# Patient Record
Sex: FEMALE | Race: WHITE | Employment: FULL TIME | URBAN - METROPOLITAN AREA
[De-identification: names, ages, dates, MRNs, and addresses within clinical notes are randomized per-mention and may not be internally consistent; named-entity substitution may affect disease eponyms.]

---

## 2017-03-17 ENCOUNTER — OFFICE VISIT (OUTPATIENT)
Dept: FAMILY MEDICINE CLINIC | Age: 47
End: 2017-03-17

## 2017-03-17 VITALS
RESPIRATION RATE: 22 BRPM | TEMPERATURE: 97.1 F | SYSTOLIC BLOOD PRESSURE: 125 MMHG | WEIGHT: 175 LBS | HEIGHT: 66 IN | BODY MASS INDEX: 28.12 KG/M2 | HEART RATE: 89 BPM | OXYGEN SATURATION: 95 % | DIASTOLIC BLOOD PRESSURE: 89 MMHG

## 2017-03-17 DIAGNOSIS — J30.2 SEASONAL ALLERGIC RHINITIS, UNSPECIFIED ALLERGIC RHINITIS TRIGGER: ICD-10-CM

## 2017-03-17 DIAGNOSIS — J45.20 MILD INTERMITTENT ASTHMA WITHOUT COMPLICATION: Primary | ICD-10-CM

## 2017-03-17 DIAGNOSIS — K21.00 GASTROESOPHAGEAL REFLUX DISEASE WITH ESOPHAGITIS: ICD-10-CM

## 2017-03-17 RX ORDER — CHLORDIAZEPOXIDE HYDROCHLORIDE AND CLIDINIUM BROMIDE 5; 2.5 MG/1; MG/1
CAPSULE ORAL
Refills: 3 | COMMUNITY
Start: 2017-02-26 | End: 2019-01-09 | Stop reason: ALTCHOICE

## 2017-03-17 RX ORDER — DEXLANSOPRAZOLE 60 MG/1
60 CAPSULE, DELAYED RELEASE ORAL
Qty: 30 CAP | Refills: 3 | Status: SHIPPED | OUTPATIENT
Start: 2017-03-17 | End: 2019-01-09 | Stop reason: ALTCHOICE

## 2017-03-17 RX ORDER — ALBUTEROL SULFATE 90 UG/1
2 AEROSOL, METERED RESPIRATORY (INHALATION)
Qty: 1 INHALER | Refills: 3 | Status: SHIPPED | OUTPATIENT
Start: 2017-03-17 | End: 2019-01-09 | Stop reason: ALTCHOICE

## 2017-03-17 NOTE — PROGRESS NOTES
Subjective:   Mally Kenney is a 40 y.o. female seen urgently with exacerbation of asthma for 3 days. Wheezing is described as mild-moderate. Associated symptoms:dry cough. Current asthma medications: Proventil MDI (or generic equivalent). Patient does not smoke cigarettes. GERD has flared up due to a few dietary excursions. She reports a very complicated history including chronic gastritis and GERD, ulcerative colitis, allergies, asthma. She is being followed by multiple providers as well as PCP. She is a regular runner, and is outside running often during the spring allergy season. She was able to complete her usual activity and work routines despite her symptoms. She has not tried allegra or Zyrtec for allergy management. She occasionally takes Loratadine. She is on Remicaid IV q 8 weeks to manage Ulcerative colitis symptoms and that is working very well. Review of Systems  Pertinent items are noted in HPI. Objective:     Visit Vitals    /89 (BP 1 Location: Right arm, BP Patient Position: Sitting)    Pulse 89    Temp 97.1 °F (36.2 °C) (Oral)    Resp 22    Ht 5' 6\" (1.676 m)    Wt 175 lb (79.4 kg)    LMP 03/01/2017 (Exact Date)    SpO2 95%    BMI 28.25 kg/m2     Oxygens Saturation 95% on  room air  General:  alert, cooperative, no distress   HEENT:  ENT exam normal, no neck nodes or sinus tenderness   Lungs: clear to auscultation bilaterally   Heart:  regular rate and rhythm, S1, S2 normal, no murmur, click, rub or gallop   Abdomen: soft, non-tender. Bowel sounds normal. No masses,  no organomegaly   Extremities: extremities normal, atraumatic, no cyanosis or edema     Assessment/Plan:   Asthma, acute exacerbation  GERD with chronic Gastritis  Allergic rhinitis    oximetry on room air was 95%  RX per orders - Use bronchodilator MDI 2 puff q6h prn,Asthma management may require a pulmonologist, and I have declined her request to prescribe a long term steroid inhaler.  Additional suggestions to patient: use antihistamine-decongestant of choice prn, push fluids, rest, use a vaporizer prn and manage GERD. Dexilant 60 mg was prescribed, pt aware that insurance may not cover, if unavailable, buy OTC Nexium and take for one month in place of Omeprazole. Return to tight control of dietary restrictions to avoid flare-ups of GERD. ICD-10-CM ICD-9-CM    1. Mild intermittent asthma without complication D70.30 965.97 albuterol (PROVENTIL HFA, VENTOLIN HFA, PROAIR HFA) 90 mcg/actuation inhaler   2. Gastroesophageal reflux disease with esophagitis K21.0 530.11 Dexlansoprazole (DEXILANT) 60 mg CpDB   .

## 2017-03-17 NOTE — PATIENT INSTRUCTIONS
Asthma Action Plan: After Your Visit  Your Care Instructions  An asthma action plan is based on peak flow and asthma symptoms. Sorting symptoms and peak flow into red, yellow, and green \"zones\" can help you know how bad your asthma is and what actions you should take. Work with your doctor to make your plan. An action plan may include:  · The peak flow readings and symptoms for each zone. · What medicines to take in each zone. · When to call a doctor. · A list of emergency contact numbers. · A list of your asthma triggers. Follow-up care is a key part of your treatment and safety. Be sure to make and go to all appointments, and call your doctor if you are having problems. It's also a good idea to know your test results and keep a list of the medicines you take. How can you care for yourself at home? · Take your daily medicines to help minimize long-term damage and avoid asthma attacks. · Check your peak flow every morning and evening. This is the best way to know how well your lungs are working. · Check your action plan to see what zone you are in.  ¨ If you are in the green zone, keep taking your daily asthma medicines as prescribed. ¨ If you are in the yellow zone, you may be having or will soon have an asthma attack. You may not have any symptoms, but your lungs are not working as well as they should. Take the medicines listed in your action plan. If you stay in the yellow zone, your doctor may need to increase the dose or add a medicine. ¨ If you are in the red zone, follow your action plan. If your symptoms or peak flow don't improve soon, you may need to go to the emergency room or be admitted to the hospital.  · Use an asthma diary. Write down your peak flow readings in the asthma diary. If you have an attack, write down what caused it (if you know), the symptoms, and what medicine you took.   · Make sure you know how and when to call your doctor or go to the hospital.  · Take both the asthma action plan and the asthma diary--along with your peak flow meter and medicines--when you see your doctor. Tell your doctor if you are having trouble following your action plan. When should you call for help? Call 911 anytime you think you may need emergency care. For example, call if:  · You have severe trouble breathing. Call your doctor now or seek immediate medical care if:  · Your symptoms do not get better after you have followed your asthma action plan. · You cough up yellow, dark brown, or bloody mucus (sputum). Watch closely for changes in your health, and be sure to contact your doctor if:  · Your coughing and wheezing get worse. · You need to use quick-relief medicine on more than 2 days a week (unless it is just for exercise). · You need help figuring out what is triggering your asthma attacks. Where can you learn more? Go to [employer] Voice.be  Enter B511 in the search box to learn more about \"Asthma Action Plan: After Your Visit. \"   © 0121-8884 Healthwise, Incorporated. Care instructions adapted under license by [employer] (which disclaims liability or warranty for this information). This care instruction is for use with your licensed healthcare professional. If you have questions about a medical condition or this instruction, always ask your healthcare professional. [place] [address] any warranty or liability for your use of this information. Content Version: 89.4.612931;  Last Revised: March 9, 2012

## 2017-04-03 ENCOUNTER — HOSPITAL ENCOUNTER (OUTPATIENT)
Dept: GENERAL RADIOLOGY | Age: 47
Discharge: HOME | End: 2017-04-03
Payer: COMMERCIAL

## 2017-04-03 DIAGNOSIS — J45.909 EXTRINSIC ASTHMA, UNSPECIFIED: ICD-10-CM

## 2017-04-03 PROCEDURE — 71020 XR CHEST PA LAT: CPT

## 2017-06-06 ENCOUNTER — HOSPITAL ENCOUNTER (OUTPATIENT)
Dept: GENERAL RADIOLOGY | Age: 47
Discharge: HOME | End: 2017-06-06
Payer: COMMERCIAL

## 2017-06-06 DIAGNOSIS — R05.9 COUGH: ICD-10-CM

## 2017-06-06 PROCEDURE — 71020 XR CHEST PA LAT: CPT

## 2019-01-09 ENCOUNTER — OFFICE VISIT (OUTPATIENT)
Dept: PRIMARY CARE CLINIC | Age: 49
End: 2019-01-09

## 2019-01-09 VITALS
RESPIRATION RATE: 16 BRPM | HEIGHT: 66 IN | BODY MASS INDEX: 29.86 KG/M2 | WEIGHT: 185.8 LBS | DIASTOLIC BLOOD PRESSURE: 82 MMHG | TEMPERATURE: 97.8 F | OXYGEN SATURATION: 99 % | HEART RATE: 92 BPM | SYSTOLIC BLOOD PRESSURE: 118 MMHG

## 2019-01-09 DIAGNOSIS — J01.10 ACUTE NON-RECURRENT FRONTAL SINUSITIS: Primary | ICD-10-CM

## 2019-01-09 RX ORDER — PANTOPRAZOLE SODIUM 40 MG/1
40 TABLET, DELAYED RELEASE ORAL DAILY
COMMUNITY

## 2019-01-09 RX ORDER — DOXYCYCLINE 100 MG/1
100 TABLET ORAL 2 TIMES DAILY
Qty: 20 TAB | Refills: 0 | Status: SHIPPED | OUTPATIENT
Start: 2019-01-09 | End: 2019-01-19

## 2019-01-09 NOTE — PROGRESS NOTES
Chief Complaint   Patient presents with    Pressure Behind the Eyes     Pt c/o sinus pressure and congestion x 1 1/2 month. Pt has taken otc sudafed and Tylenol for relief.

## 2019-01-10 NOTE — PROGRESS NOTES
Subjective:   Precious Ross is a 53 y.o. female who complains of coryza, congestion, sore throat, swollen glands, dry cough, cough described as productive, productive of dark sputum, headache, bilateral sinus pain and chills for 8 days, gradually worsening since that time. She denies a history of shortness of breath and wheezing. Patient has tenderness over the frontal sinus area on both sides. Reports she is able to get most of the mucus out by blowing her nose but it is now a very dark color. Patient is ill appearing. Will start course of antibiotics today. Evaluation to date: none. Treatment to date: OTC products. Patient does not smoke cigarettes. Relevant PMH: No pertinent additional PMH. Patient Active Problem List   Diagnosis Code    Colitis K52.9     Patient Active Problem List    Diagnosis Date Noted    Colitis 06/07/2013     Current Outpatient Medications   Medication Sig Dispense Refill    pantoprazole (PROTONIX) 40 mg tablet Take 40 mg by mouth daily.  doxycycline (ADOXA) 100 mg tablet Take 1 Tab by mouth two (2) times a day for 10 days. 20 Tab 0    INFLIXIMAB (REMICADE IV) 412 mg by IntraVENous route every two (2) months. Allergies   Allergen Reactions    Banana Hives     Vomiting      Nsaids (Non-Steroidal Anti-Inflammatory Drug) Other (comments)     Exacerbation of Ulcerative Colitis    Sulfa (Sulfonamide Antibiotics) Nausea and Vomiting    Sulfa (Sulfonamide Antibiotics) Hives     Past Medical History:   Diagnosis Date    Asthma     GERD (gastroesophageal reflux disease)     Ulcerative colitis     Ulcerative colitis ([facility] [address]      Past Surgical History:   Procedure Laterality Date    HX LIPOSUCTION      HX VASCULAR ACCESS      removed in 2008     History reviewed. No pertinent family history.   Social History     Tobacco Use    Smoking status: Never Smoker    Smokeless tobacco: Never Used   Substance Use Topics    Alcohol use: Yes        Review of Systems  A comprehensive review of systems was negative except for that written in the HPI. Objective:     Visit Vitals  /82   Pulse 92   Temp 97.8 °F (36.6 °C) (Oral)   Resp 16   Ht 5' 6\" (1.676 m)   Wt 185 lb 12.8 oz (84.3 kg)   SpO2 99%   BMI 29.99 kg/m²     General:  alert, cooperative, no distress   Eyes: conjunctivae/corneas clear. PERRL, EOM's intact. Fundi benign   Ears: normal TM's and external ear canals AU   Sinuses: tenderness over bilateral frontal   Mouth:  Lips, mucosa, and tongue normal. Teeth and gums normal   Neck: supple, symmetrical, trachea midline and no adenopathy. Heart: S1 and S2 normal, no murmurs noted. Lungs: clear to auscultation bilaterally   Abdomen: soft, non-tender. Bowel sounds normal. No masses,  no organomegaly        Assessment/Plan:   sinusitis  Suggested symptomatic OTC remedies. RTC prn. Discussed diagnosis and treatment of viral URIs. ICD-10-CM ICD-9-CM    1. Acute non-recurrent frontal sinusitis J01.10 461.1 doxycycline (ADOXA) 100 mg tablet   . Visit Vitals  /82   Pulse 92   Temp 97.8 °F (36.6 °C) (Oral)   Resp 16   Ht 5' 6\" (1.676 m)   Wt 185 lb 12.8 oz (84.3 kg)   SpO2 99%   BMI 29.99 kg/m²     Spoke with the patient regarding their blood pressure (BP) reading at today's visit. The patient verbalized understanding of need to maintain BP lower than 140/90. The patient will follow up with their primary care physician regarding management and/or medications that may be needed. Drepssion Screening has been completed. The patient isdenies having a history of depression. The patient is nottaking medication and is being followed by their PCP at this time. This patient does  have a primary care physician. Referral was not given a referral at todays visit. Immunizations: The patient is current on their influenza immunization at this time. The patient does not   want to receive the influenza immunization today.     Follow up instructions given at today's visit were verbalized by the patient/parent. The signs of infection are fever > 100.4, increase fatigue, change in mental status, or decrease in urinary output. The patient/parent verbalized understanding of taking medications prescribed during this visit as prescribed.

## 2019-01-10 NOTE — PATIENT INSTRUCTIONS
Sinusitis: Care Instructions  Your Care Instructions    Sinusitis is an infection of the lining of the sinus cavities in your head. Sinusitis often follows a cold. It causes pain and pressure in your head and face. In most cases, sinusitis gets better on its own in 1 to 2 weeks. But some mild symptoms may last for several weeks. Sometimes antibiotics are needed. Follow-up care is a key part of your treatment and safety. Be sure to make and go to all appointments, and call your doctor if you are having problems. It's also a good idea to know your test results and keep a list of the medicines you take. How can you care for yourself at home? · Take an over-the-counter pain medicine, such as acetaminophen (Tylenol), ibuprofen (Advil, Motrin), or naproxen (Aleve). Read and follow all instructions on the label. · If the doctor prescribed antibiotics, take them as directed. Do not stop taking them just because you feel better. You need to take the full course of antibiotics. · Be careful when taking over-the-counter cold or flu medicines and Tylenol at the same time. Many of these medicines have acetaminophen, which is Tylenol. Read the labels to make sure that you are not taking more than the recommended dose. Too much acetaminophen (Tylenol) can be harmful. · Breathe warm, moist air from a steamy shower, a hot bath, or a sink filled with hot water. Avoid cold, dry air. Using a humidifier in your home may help. Follow the directions for cleaning the machine. · Use saline (saltwater) nasal washes to help keep your nasal passages open and wash out mucus and bacteria. You can buy saline nose drops at a grocery store or drugstore. Or you can make your own at home by adding 1 teaspoon of salt and 1 teaspoon of baking soda to 2 cups of distilled water. If you make your own, fill a bulb syringe with the solution, insert the tip into your nostril, and squeeze gently. Jair Irons your nose.   · Put a hot, wet towel or a warm gel pack on your face 3 or 4 times a day for 5 to 10 minutes each time. · Try a decongestant nasal spray like oxymetazoline (Afrin). Do not use it for more than 3 days in a row. Using it for more than 3 days can make your congestion worse. When should you call for help? Call your doctor now or seek immediate medical care if:    · You have new or worse swelling or redness in your face or around your eyes.     · You have a new or higher fever.    Watch closely for changes in your health, and be sure to contact your doctor if:    · You have new or worse facial pain.     · The mucus from your nose becomes thicker (like pus) or has new blood in it.     · You are not getting better as expected. Where can you learn more? Go to http://gabby-anil.info/. Enter Y674 in the search box to learn more about \"Sinusitis: Care Instructions. \"  Current as of: March 28, 2018  Content Version: 11.8  © 5200-7667 Healthwise, Incorporated. Care instructions adapted under license by [email or web address] (which disclaims liability or warranty for this information). If you have questions about a medical condition or this instruction, always ask your healthcare professional. [place] [address] any warranty or liability for your use of this information.

## 2019-03-12 ENCOUNTER — HOSPITAL ENCOUNTER (OUTPATIENT)
Dept: MRI IMAGING | Age: 49
Discharge: HOME | End: 2019-03-12
Payer: COMMERCIAL

## 2019-03-12 DIAGNOSIS — M17.10 PATELLOFEMORAL ARTHROSIS: ICD-10-CM

## 2019-03-12 DIAGNOSIS — M25.561 CHRONIC PAIN OF RIGHT KNEE: ICD-10-CM

## 2019-03-12 DIAGNOSIS — G89.29 CHRONIC PAIN OF RIGHT KNEE: ICD-10-CM

## 2019-03-12 DIAGNOSIS — M25.561 PAIN IN PATELLA, RIGHT: ICD-10-CM

## 2019-03-12 DIAGNOSIS — S83.241A TEAR OF MEDIAL MENISCUS OF RIGHT KNEE, INITIAL ENCOUNTER: ICD-10-CM

## 2019-03-12 PROCEDURE — 73721 MRI JNT OF LWR EXTRE W/O DYE: CPT

## 2019-03-13 ENCOUNTER — OFFICE VISIT (OUTPATIENT)
Dept: PRIMARY CARE CLINIC | Age: 49
End: 2019-03-13

## 2019-03-13 VITALS
DIASTOLIC BLOOD PRESSURE: 80 MMHG | SYSTOLIC BLOOD PRESSURE: 123 MMHG | BODY MASS INDEX: 29.89 KG/M2 | HEIGHT: 66 IN | OXYGEN SATURATION: 96 % | RESPIRATION RATE: 17 BRPM | TEMPERATURE: 97.9 F | HEART RATE: 100 BPM | WEIGHT: 186 LBS

## 2019-03-13 DIAGNOSIS — R22.31 LOCALIZED SWELLING ON RIGHT HAND: ICD-10-CM

## 2019-03-13 DIAGNOSIS — L03.113 CELLULITIS OF RIGHT HAND: Primary | ICD-10-CM

## 2019-03-13 RX ORDER — CEPHALEXIN 500 MG/1
500 CAPSULE ORAL 3 TIMES DAILY
Qty: 30 CAP | Refills: 0 | Status: SHIPPED | OUTPATIENT
Start: 2019-03-13 | End: 2020-01-31 | Stop reason: ALTCHOICE

## 2019-03-13 NOTE — PROGRESS NOTES
Chief Complaint   Patient presents with    Hand Swelling     Pt c/i right hand swelling and redness x 2 weeks.

## 2019-03-13 NOTE — PATIENT INSTRUCTIONS
Hand Bruises: Care Instructions  Your Care Instructions  Bruises, or contusions, can happen as a result of an impact or fall. Most people think of a bruise as a black-and-blue spot. This happens when small blood vessels get torn and leak blood under the skin. The bruise may turn purplish black, reddish blue, or yellowish green as it heals. But bones and muscles can also get bruised. This may damage the hand but not cause a bruise that you can see. Most bruises aren't serious and will go away on their own in 2 to 4 weeks. But sometimes a more serious hand injury might not heal on its own. Tell your doctor if you have new symptoms or your injury is not getting better over time. You may have tests to see if you have bone or nerve damage. These tests may include X-rays, a CT scan, or an MRI. If you damaged bones or muscles, you may need more treatment. The doctor has checked you carefully, but problems can develop later. If you notice any problems or new symptoms, get medical treatment right away. Follow-up care is a key part of your treatment and safety. Be sure to make and go to all appointments, and call your doctor if you are having problems. It's also a good idea to know your test results and keep a list of the medicines you take. How can you care for yourself at home? · Put ice or a cold pack on the hand for 10 to 20 minutes at a time. Put a thin cloth between the ice and your skin. · Prop up your hand on a pillow when you ice it or anytime you sit or lie down during the next 3 days. Try to keep your hand above the level of your heart. This will help reduce swelling. · Be safe with medicines. Read and follow all instructions on the label. ? If the doctor gave you a prescription medicine for pain, take it as prescribed. ? If you are not taking a prescription pain medicine, ask your doctor if you can take an over-the-counter medicine.   · Be sure to follow your doctor's advice about moving and exercising your injured hand. When should you call for help? Call your doctor now or seek immediate medical care if:    · Your pain gets worse.     · You have new or worse swelling.     · You have tingling, weakness, or numbness in the area near the bruise.     · The area near the bruise is cold or pale.     · You have symptoms of infection, such as:  ? Increased pain, swelling, warmth, or redness. ? Red streaks leading from the area. ? Pus draining from the area. ? A fever.    Watch closely for changes in your health, and be sure to contact your doctor if:    · You do not get better as expected. Where can you learn more? Go to http://gabby-anil.info/. Enter R912 in the search box to learn more about \"Hand Bruises: Care Instructions. \"  Current as of: September 23, 2018  Content Version: 11.9  © 2430-9772 [employer]. Care instructions adapted under license by [employer] (which disclaims liability or warranty for this information). If you have questions about a medical condition or this instruction, always ask your healthcare professional. [place] [address] any warranty or liability for your use of this information.

## 2019-03-14 NOTE — PROGRESS NOTES
Chief Complaint   Patient presents with    Hand Swelling     she is a 53y.o. year old female who presents for evalution. She complains of right hand swelling between the 3rd -4th MCP joints. The area had a lump that has gradually become larger, however there was no pain in the area. Suddenly, last night she started having severe pain, redness, warmth over the area. The swelling has increased. It is painful to touch and hand mobility is limited. She denies a fever. She does have a complicated medical history that includes an imflammatory condition for which she receives Remicaid monthly via port in right chest.    Reviewed PmHx, RxHx, FmHx, SocHx, AllgHx and updated and dated in the chart. Review of Systems - negative except as listed above in the HPI    Objective:     Vitals:    03/13/19 1637   BP: 123/80   Pulse: 100   Resp: 17   Temp: 97.9 °F (36.6 °C)   TempSrc: Oral   SpO2: 96%   Weight: 186 lb (84.4 kg)   Height: 5' 6\" (1.676 m)       Current Outpatient Medications   Medication Sig    cephALEXin (KEFLEX) 500 mg capsule Take 1 Cap by mouth three (3) times daily.  pantoprazole (PROTONIX) 40 mg tablet Take 40 mg by mouth daily.  INFLIXIMAB (REMICADE IV) 412 mg by IntraVENous route every two (2) months. No current facility-administered medications for this visit.         Physical Examination: General appearance - alert, well appearing, and in no distress  Mental status - alert, oriented to person, place, and time  Eyes - pupils equal and reactive, extraocular eye movements intact  Ears - bilateral TM's and external ear canals normal  Nose - normal and patent, no erythema, discharge or polyps  Mouth - mucous membranes moist, pharynx normal without lesions  Neck - supple, no significant adenopathy  Lymphatics - no palpable lymphadenopathy, no hepatosplenomegaly  Chest - clear to auscultation, no wheezes, rales or rhonchi, symmetric air entry, port under the skin on right chest  Heart - normal rate, regular rhythm, normal S1, S2, no murmurs, rubs, clicks or gallops  Extremities - peripheral pulses normal, no pedal edema, no clubbing or cyanosis. Right hand with golf ball sized lump between the 3-4th MCP area, the lump is firm, very tender, warm and has new erythema around and over the area. Unable to palpate due to severity of pain. XRAY:  EXAM: XR HAND RT MIN 3 V     INDICATION: pain and swelling of right hand x 1 month, suddenly worsened with  severe pain, warmth x 12 hours. .     COMPARISON: None.     FINDINGS: Three views of the right hand demonstrate no fracture or other acute  osseous or articular abnormality. There is soft tissue swelling centered in the  region of the MCP joints. . There is no significant joint space narrowing,  erosion, or periarticular calcification. Tiny cysts are seen in the third and  fourth metacarpal heads.     IMPRESSION  IMPRESSION: Soft tissue swelling. .  Assessment/ Plan:   Diagnoses and all orders for this visit:    1. Localized swelling on right hand  -     cephALEXin (KEFLEX) 500 mg capsule; Take 1 Cap by mouth three (3) times daily. I have discussed concerns with the care of this and need to see a hand specialist ASAP. She declined a referral to orth as she is currently being treated by [address] [place] [address] for a knee problem. She will have it evaluated at her appointment in 4 days. Follow-up Disposition:  Return if symptoms worsen or fail to improve. I have discussed the diagnosis with the patient and the intended plan as seen in the above orders. The patient has received an after-visit summary and questions were answered concerning future plans. Pt conveyed understanding of plan.     Medication Side Effects and Warnings were discussed with patient      Mayelin Brizuela NP

## 2019-12-21 ENCOUNTER — APPOINTMENT (OUTPATIENT)
Dept: CT IMAGING | Age: 49
End: 2019-12-21
Attending: EMERGENCY MEDICINE
Payer: COMMERCIAL

## 2019-12-21 ENCOUNTER — HOSPITAL ENCOUNTER (EMERGENCY)
Age: 49
Discharge: HOME | End: 2019-12-21
Attending: EMERGENCY MEDICINE
Payer: COMMERCIAL

## 2019-12-21 VITALS
TEMPERATURE: 97.9 F | SYSTOLIC BLOOD PRESSURE: 123 MMHG | RESPIRATION RATE: 16 BRPM | BODY MASS INDEX: 31.07 KG/M2 | HEIGHT: 66 IN | HEART RATE: 70 BPM | WEIGHT: 193.34 LBS | DIASTOLIC BLOOD PRESSURE: 73 MMHG | OXYGEN SATURATION: 100 %

## 2019-12-21 DIAGNOSIS — R51.9 NONINTRACTABLE HEADACHE, UNSPECIFIED CHRONICITY PATTERN, UNSPECIFIED HEADACHE TYPE: Primary | ICD-10-CM

## 2019-12-21 LAB — HCG UR QL: NEGATIVE

## 2019-12-21 PROCEDURE — 70450 CT HEAD/BRAIN W/O DYE: CPT

## 2019-12-21 PROCEDURE — 74011250636 HC RX REV CODE- 250/636: Performed by: EMERGENCY MEDICINE

## 2019-12-21 PROCEDURE — 96375 TX/PRO/DX INJ NEW DRUG ADDON: CPT

## 2019-12-21 PROCEDURE — 96374 THER/PROPH/DIAG INJ IV PUSH: CPT

## 2019-12-21 PROCEDURE — 99284 EMERGENCY DEPT VISIT MOD MDM: CPT

## 2019-12-21 PROCEDURE — 81025 URINE PREGNANCY TEST: CPT

## 2019-12-21 RX ORDER — BUTALBITAL, ACETAMINOPHEN AND CAFFEINE 300; 40; 50 MG/1; MG/1; MG/1
1 CAPSULE ORAL
Qty: 15 CAP | Refills: 0 | Status: SHIPPED | OUTPATIENT
Start: 2019-12-21 | End: 2020-01-31

## 2019-12-21 RX ORDER — PROCHLORPERAZINE EDISYLATE 5 MG/ML
10 INJECTION INTRAMUSCULAR; INTRAVENOUS
Status: COMPLETED | OUTPATIENT
Start: 2019-12-21 | End: 2019-12-21

## 2019-12-21 RX ORDER — DIPHENHYDRAMINE HYDROCHLORIDE 50 MG/ML
25 INJECTION, SOLUTION INTRAMUSCULAR; INTRAVENOUS
Status: COMPLETED | OUTPATIENT
Start: 2019-12-21 | End: 2019-12-21

## 2019-12-21 RX ADMIN — PROCHLORPERAZINE EDISYLATE 10 MG: 5 INJECTION INTRAMUSCULAR; INTRAVENOUS at 13:35

## 2019-12-21 RX ADMIN — SODIUM CHLORIDE 1000 ML: 900 INJECTION, SOLUTION INTRAVENOUS at 13:35

## 2019-12-21 RX ADMIN — DIPHENHYDRAMINE HYDROCHLORIDE 25 MG: 50 INJECTION, SOLUTION INTRAMUSCULAR; INTRAVENOUS at 13:35

## 2019-12-21 NOTE — ED TRIAGE NOTES
Pt states that she started having headaches after thanksgiving, pt states that it is a lot of pressure around her head. Pt states that she went to her doctor and was told she had a virus. Pt is tearful.

## 2019-12-21 NOTE — DISCHARGE INSTRUCTIONS

## 2019-12-21 NOTE — ED PROVIDER NOTES
Ms. Feliciano Lopes is a 50-year-old female presents to the ER with headache. Said her headache is been present for the last 4 weeks. She did not really get headaches prior to this. She does report having a migraine once in her life. Said her pain is diffuse across her head. No nausea or vomiting. No new numbness, tingling, or weakness. No new vision changes. At baseline, she is blind in her right eye. No chest pain or trouble breathing. No changes with her urine or bowel movements. Saw her PCP 9 days ago. She is scheduled for an MRI next week. She denies any other complaints. Past Medical History:   Diagnosis Date    Asthma     GERD (gastroesophageal reflux disease)     Ulcerative colitis     Ulcerative colitis ([facility] [address]        Past Surgical History:   Procedure Laterality Date    HX LIPOSUCTION      HX VASCULAR ACCESS      removed in 2008         History reviewed. No pertinent family history.     Social History     Socioeconomic History    Marital status: UNKNOWN     Spouse name: Not on file    Number of children: Not on file    Years of education: Not on file    Highest education level: Not on file   Occupational History    Not on file   Social Needs    Financial resource strain: Not on file    Food insecurity:     Worry: Not on file     Inability: Not on file    Transportation needs:     Medical: Not on file     Non-medical: Not on file   Tobacco Use    Smoking status: Never Smoker    Smokeless tobacco: Never Used   Substance and Sexual Activity    Alcohol use: Yes     Comment: social    Drug use: No    Sexual activity: Not on file   Lifestyle    Physical activity:     Days per week: Not on file     Minutes per session: Not on file    Stress: Not on file   Relationships    Social connections:     Talks on phone: Not on file     Gets together: Not on file     Attends [employer] service: Not on file     Active member of club or organization: Not on file     Attends meetings of clubs or organizations: Not on file     Relationship status: Not on file    Intimate partner violence:     Fear of current or ex partner: Not on file     Emotionally abused: Not on file     Physically abused: Not on file     Forced sexual activity: Not on file   Other Topics Concern    Not on file   Social History Narrative    ** Merged History Encounter **              ALLERGIES: Banana; Nsaids (non-steroidal anti-inflammatory drug); Sulfa (sulfonamide antibiotics); and Sulfa (sulfonamide antibiotics)    Review of Systems   Constitutional: Negative for chills and fever. HENT: Negative for rhinorrhea and sore throat. Respiratory: Negative for cough and shortness of breath. Cardiovascular: Negative for chest pain. Gastrointestinal: Negative for abdominal pain, diarrhea, nausea and vomiting. Genitourinary: Negative for dysuria and urgency. Musculoskeletal: Negative for arthralgias and back pain. Skin: Negative for rash. Neurological: Positive for headaches. Negative for dizziness, weakness and light-headedness. Vitals:    12/21/19 1229   BP: (!) 165/100   Pulse: (!) 111   Resp: 20   Temp: 97.7 °F (36.5 °C)   SpO2: 100%   Weight: 87.7 kg (193 lb 5.5 oz)   Height: 5' 6\" (1.676 m)            Physical Exam     Vital signs reviewed. Nursing notes reviewed.     Const:  No acute distress, well developed, well nourished  Head:  Atraumatic, normocephalic  Eyes:  PERRL, conjunctiva normal, no scleral icterus  Neck:  Supple, trachea midline  Cardiovascular:  RRR, no murmurs, no gallops, no rubs  Resp:  No resp distress, no increased work of breathing, no wheezes, no rhonchi, no rales,  Abd:  Soft, non-tender, non-distended, no rebound, no guarding, no CVA tenderness  MSK:  No pedal edema, normal ROM  Neuro:  Alert and oriented x3, no cranial nerve defect  Skin:  Warm, dry, intact  Psych: normal mood and affect, behavior is normal, judgement and thought content is normal          MDM  Number of Diagnoses or Management Options     Amount and/or Complexity of Data Reviewed  Tests in the radiology section of CPT®: ordered and reviewed  Review and summarize past medical records: yes              Ms. Dimitrios Rubio is a 27-year-old female who presents to the ER with headache. She is well-appearing in the ER. No signs or symptoms of meningitis. No mass on head CT. She still having some of a headache after pain medicine here. I will start her on Fioricet at home. She has an MRI scheduled for next week. She agrees to follow-up return to the ER with any new or worsening symptoms.     Procedures

## 2019-12-30 ENCOUNTER — HOSPITAL ENCOUNTER (OUTPATIENT)
Dept: MRI IMAGING | Age: 49
Discharge: HOME | End: 2019-12-30
Payer: COMMERCIAL

## 2019-12-30 DIAGNOSIS — G44.52 NEW DAILY PERSISTENT HEADACHE: ICD-10-CM

## 2019-12-30 PROCEDURE — 70553 MRI BRAIN STEM W/O & W/DYE: CPT

## 2019-12-30 RX ORDER — GADOTERATE MEGLUMINE 376.9 MG/ML
17 INJECTION INTRAVENOUS
Status: COMPLETED | OUTPATIENT
Start: 2019-12-30 | End: 2019-12-30

## 2019-12-30 RX ORDER — SODIUM CHLORIDE 0.9 % (FLUSH) 0.9 %
10 SYRINGE (ML) INJECTION
Status: COMPLETED | OUTPATIENT
Start: 2019-12-30 | End: 2019-12-30

## 2019-12-30 RX ADMIN — Medication 10 ML: at 08:00

## 2019-12-30 RX ADMIN — GADOTERATE MEGLUMINE 17 ML: 376.9 INJECTION INTRAVENOUS at 08:00

## 2020-01-31 ENCOUNTER — OFFICE VISIT (OUTPATIENT)
Dept: PRIMARY CARE CLINIC | Age: 50
End: 2020-01-31

## 2020-01-31 VITALS
SYSTOLIC BLOOD PRESSURE: 127 MMHG | OXYGEN SATURATION: 98 % | BODY MASS INDEX: 32.08 KG/M2 | WEIGHT: 199.6 LBS | TEMPERATURE: 98 F | DIASTOLIC BLOOD PRESSURE: 88 MMHG | HEIGHT: 66 IN | RESPIRATION RATE: 18 BRPM | HEART RATE: 83 BPM

## 2020-01-31 DIAGNOSIS — H66.92 LEFT ACUTE OTITIS MEDIA: Primary | ICD-10-CM

## 2020-01-31 DIAGNOSIS — J06.9 URTI (ACUTE UPPER RESPIRATORY INFECTION): ICD-10-CM

## 2020-01-31 DIAGNOSIS — R07.89 CHEST TIGHTNESS: ICD-10-CM

## 2020-01-31 DIAGNOSIS — J45.20 MILD INTERMITTENT ASTHMA, UNSPECIFIED WHETHER COMPLICATED: ICD-10-CM

## 2020-01-31 RX ORDER — PANTOPRAZOLE SODIUM 20 MG/1
20 TABLET, DELAYED RELEASE ORAL
COMMUNITY
End: 2020-01-31

## 2020-01-31 RX ORDER — METHYLPREDNISOLONE 4 MG/1
TABLET ORAL
Qty: 1 DOSE PACK | Refills: 0 | Status: SHIPPED | OUTPATIENT
Start: 2020-01-31 | End: 2021-02-24 | Stop reason: ALTCHOICE

## 2020-01-31 RX ORDER — TRAMADOL HYDROCHLORIDE 50 MG/1
1 TABLET ORAL
COMMUNITY
Start: 2019-03-19 | End: 2020-01-31

## 2020-01-31 RX ORDER — INFLIXIMAB 100 MG/10ML
INJECTION, POWDER, LYOPHILIZED, FOR SOLUTION INTRAVENOUS
COMMUNITY
End: 2020-01-31 | Stop reason: SDUPTHER

## 2020-01-31 RX ORDER — SUCRALFATE 1 G/1
TABLET ORAL
COMMUNITY
Start: 2019-12-12 | End: 2020-01-31

## 2020-01-31 RX ORDER — ALBUTEROL SULFATE 90 UG/1
1-2 AEROSOL, METERED RESPIRATORY (INHALATION)
Qty: 1 INHALER | Refills: 0 | OUTPATIENT
Start: 2020-01-31 | End: 2021-08-02

## 2020-01-31 RX ORDER — PANTOPRAZOLE SODIUM 40 MG/1
40 TABLET, DELAYED RELEASE ORAL
COMMUNITY
Start: 2018-08-04 | End: 2020-01-31 | Stop reason: SDUPTHER

## 2020-01-31 RX ORDER — AMOXICILLIN AND CLAVULANATE POTASSIUM 875; 125 MG/1; MG/1
1 TABLET, FILM COATED ORAL EVERY 12 HOURS
Qty: 20 TAB | Refills: 0 | Status: SHIPPED | OUTPATIENT
Start: 2020-01-31 | End: 2020-02-10

## 2020-01-31 NOTE — PROGRESS NOTES
Linda Ward is a 51 y.o. female    Room:4    Chief Complaint   Patient presents with    Cold     Pt States \" cough, and getting sob think i need a steriod , started in chest and struggling to move air, started yesterday and has used husbands inhaler\". Visit Vitals  /88 (BP 1 Location: Left arm, BP Patient Position: Sitting)   Pulse 83   Temp 98 °F (36.7 °C) (Oral)   Resp 18   Ht 5' 6\" (1.676 m)   Wt 199 lb 9.6 oz (90.5 kg)   SpO2 98%   BMI 32.22 kg/m²       Pain Scale: 0 - No pain/10    1. Have you been to the ER, urgent care clinic since your last visit? Hospitalized since your last visit? No    2. Have you seen or consulted any other health care providers outside of the [address] [place] [address] since your last visit? Include any pap smears or colon screening.  No

## 2020-01-31 NOTE — PROGRESS NOTES
Subjective:   Geraldine Florian is a 51 y.o. female who complains of congestion, post nasal drip, dry cough, headache, left ear pain and clear nasal discharge for 4 days, gradually worsening since that time. More recently has developed chest tightness and SOB. 02 sat today at work was 94%. Used her 's albuterol inhaler with minimal relief. Has mild asthma. Evaluation to date: none. Treatment to date: OTC products, albuterol inh. Patient does not smoke cigarettes. Relevant PMH:   Past Medical History:   Diagnosis Date    Asthma     GERD (gastroesophageal reflux disease)     Ulcerative colitis     Ulcerative colitis ([facility] [address]      Past Surgical History:   Procedure Laterality Date    HX LIPOSUCTION      HX VASCULAR ACCESS      removed in 2008     Allergies   Allergen Reactions    Banana Hives     Vomiting      Ibuprofen Other (comments)    Nsaids (Non-Steroidal Anti-Inflammatory Drug) Other (comments)     Exacerbation of Ulcerative Colitis    Sulfa (Sulfonamide Antibiotics) Nausea and Vomiting    Sulfa (Sulfonamide Antibiotics) Hives           Review of Systems  Pertinent items are noted in HPI. Objective:     Visit Vitals  /88 (BP 1 Location: Left arm, BP Patient Position: Sitting)   Pulse 83   Temp 98 °F (36.7 °C) (Oral)   Resp 18   Ht 5' 6\" (1.676 m)   Wt 199 lb 9.6 oz (90.5 kg)   LMP 01/25/2020 (Exact Date)   SpO2 98%   BMI 32.22 kg/m²     General:  alert, cooperative, no distress   Eyes: negative   Ears: abnormal TM AD - serous middle ear fluid, effusion present, abnormal TM AS - erythematous, serous middle ear fluid, injected   Sinuses: Normal paranasal sinuses without tenderness   Mouth:  Lips, mucosa, and tongue normal. Teeth and gums normal and normal findings: oropharynx pink & moist without lesions or evidence of thrush   Neck: supple, symmetrical, trachea midline and no adenopathy. Heart: S1 and S2 normal, no murmurs noted.     Lungs: clear to auscultation bilaterally Assessment/Plan:       ICD-10-CM ICD-9-CM    1. Left acute otitis media H66.92 382.9    2. URTI (acute upper respiratory infection) J06.9 465.9    3. Chest tightness R07.89 786.59    4. Mild intermittent asthma, unspecified whether complicated F77.04 371.30      Orders Placed This Encounter    DISCONTD: DM/p-ephed/acetaminoph/doxylam ([place] D PO)    DISCONTD: guaifenesin/acetaminophen (TYLENOL CHEST CONGESTION PO)    DISCONTD: sucralfate (CARAFATE) 1 gram tablet    DISCONTD: traMADol (ULTRAM) 50 mg tablet    DISCONTD: inFLIXimab (REMICADE) 100 mg injection    DISCONTD: pantoprazole (PROTONIX) 20 mg tablet    DISCONTD: pantoprazole (PROTONIX) 40 mg tablet    amoxicillin-clavulanate (AUGMENTIN) 875-125 mg per tablet    albuterol (PROVENTIL HFA, VENTOLIN HFA, PROAIR HFA) 90 mcg/actuation inhaler    methylPREDNISolone (MEDROL DOSEPACK) 4 mg tablet     Suggested symptomatic OTC remedies. RTC prn. Erika Zacarias NP  This note will not be viewable in [address]

## 2020-01-31 NOTE — PATIENT INSTRUCTIONS

## 2020-02-27 ENCOUNTER — HOSPITAL ENCOUNTER (OUTPATIENT)
Dept: MAMMOGRAPHY | Age: 50
Discharge: HOME | End: 2020-02-27
Attending: FAMILY MEDICINE
Payer: COMMERCIAL

## 2020-02-27 DIAGNOSIS — Z12.39 SCREENING BREAST EXAMINATION: ICD-10-CM

## 2020-02-27 PROCEDURE — 77063 BREAST TOMOSYNTHESIS BI: CPT

## 2020-02-27 RX ORDER — ACETAMINOPHEN 325 MG/1
650 TABLET ORAL ONCE
Status: DISCONTINUED | OUTPATIENT
Start: 2020-03-03 | End: 2020-03-03 | Stop reason: HOSPADM

## 2020-02-27 RX ORDER — DIPHENHYDRAMINE HYDROCHLORIDE 50 MG/ML
50 INJECTION, SOLUTION INTRAMUSCULAR; INTRAVENOUS
Status: DISCONTINUED | OUTPATIENT
Start: 2020-03-02 | End: 2020-03-03 | Stop reason: HOSPADM

## 2020-02-27 RX ORDER — SODIUM CHLORIDE 9 MG/ML
25 INJECTION, SOLUTION INTRAVENOUS CONTINUOUS
Status: DISCONTINUED | OUTPATIENT
Start: 2020-03-02 | End: 2020-03-03 | Stop reason: HOSPADM

## 2020-02-27 RX ORDER — ACETAMINOPHEN 325 MG/1
650 TABLET ORAL
Status: DISCONTINUED | OUTPATIENT
Start: 2020-03-02 | End: 2020-03-03 | Stop reason: HOSPADM

## 2020-03-02 ENCOUNTER — HOSPITAL ENCOUNTER (OUTPATIENT)
Dept: INFUSION THERAPY | Age: 50
Discharge: HOME | End: 2020-03-02
Payer: COMMERCIAL

## 2020-03-02 VITALS
RESPIRATION RATE: 16 BRPM | BODY MASS INDEX: 30.99 KG/M2 | TEMPERATURE: 97.8 F | DIASTOLIC BLOOD PRESSURE: 68 MMHG | SYSTOLIC BLOOD PRESSURE: 110 MMHG | WEIGHT: 192 LBS | HEART RATE: 70 BPM

## 2020-03-02 PROCEDURE — 74011250636 HC RX REV CODE- 250/636: Performed by: INTERNAL MEDICINE

## 2020-03-02 PROCEDURE — 96415 CHEMO IV INFUSION ADDL HR: CPT

## 2020-03-02 PROCEDURE — 77030012965 HC NDL HUBR BBMI -A

## 2020-03-02 PROCEDURE — 96365 THER/PROPH/DIAG IV INF INIT: CPT

## 2020-03-02 PROCEDURE — 96366 THER/PROPH/DIAG IV INF ADDON: CPT

## 2020-03-02 PROCEDURE — 96413 CHEMO IV INFUSION 1 HR: CPT

## 2020-03-02 RX ORDER — SODIUM CHLORIDE 0.9 % (FLUSH) 0.9 %
5-10 SYRINGE (ML) INJECTION AS NEEDED
Status: DISCONTINUED | OUTPATIENT
Start: 2020-03-02 | End: 2020-03-03 | Stop reason: HOSPADM

## 2020-03-02 RX ORDER — SODIUM CHLORIDE 9 MG/ML
10 INJECTION INTRAMUSCULAR; INTRAVENOUS; SUBCUTANEOUS AS NEEDED
Status: DISCONTINUED | OUTPATIENT
Start: 2020-03-02 | End: 2020-03-03 | Stop reason: HOSPADM

## 2020-03-02 RX ORDER — HEPARIN 100 UNIT/ML
500 SYRINGE INTRAVENOUS AS NEEDED
Status: DISCONTINUED | OUTPATIENT
Start: 2020-03-02 | End: 2020-03-03 | Stop reason: HOSPADM

## 2020-03-02 RX ADMIN — INFLIXIMAB 400 MG: 100 INJECTION, POWDER, LYOPHILIZED, FOR SOLUTION INTRAVENOUS at 09:45

## 2020-03-02 RX ADMIN — SODIUM CHLORIDE 25 ML/HR: 900 INJECTION, SOLUTION INTRAVENOUS at 09:45

## 2020-03-02 NOTE — PROGRESS NOTES
Outpatient Infusion Center - Progress Note    5698 Pt admit to [facility] for Remicade ambulatory in stable condition. Assessment completed. No new concerns voiced. Port accessed with positive blood return. Line flushed and connected to NS KVO. Pt has received this medication for years and is familiar with all possible side effects. Pt only had to change infusion centers due to insurance. Pt takes pre meds prior at home. Visit Vitals  /65   Pulse 68   Temp 97.9 °F (36.6 °C)   Resp 16   Wt 87.1 kg (192 lb)   LMP 02/17/2020   Breastfeeding No   BMI 30.99 kg/m²       Medications:  Medications Administered     0.9% sodium chloride infusion     Admin Date  03/02/2020 Action  New Bag Dose  25 mL/hr Rate  25 mL/hr Route  IntraVENous Administered By  Tru Diaz RN          inFLIXimab (REMICADE) 400 mg in 0.9% sodium chloride 250 mL, overfill volume 25 mL infusion     Admin Date  03/02/2020 Action  New Bag Dose  400 mg Route  IntraVENous Administered By  Tru Diaz RN                    1200 Pt tolerated treatment well. Port maintained positive blood return throughout treatment, flushed with positive blood return at conclusion and de-accessed. D/c home ambulatory in no distress. Pt aware of next [facility] appointment scheduled for 04/07/20.

## 2020-04-23 RX ORDER — SODIUM CHLORIDE 9 MG/ML
25 INJECTION, SOLUTION INTRAVENOUS CONTINUOUS
Status: DISCONTINUED | OUTPATIENT
Start: 2020-04-27 | End: 2020-04-28 | Stop reason: HOSPADM

## 2020-04-23 RX ORDER — DIPHENHYDRAMINE HYDROCHLORIDE 50 MG/ML
50 INJECTION, SOLUTION INTRAMUSCULAR; INTRAVENOUS
Status: DISCONTINUED | OUTPATIENT
Start: 2020-04-27 | End: 2020-05-06

## 2020-04-23 RX ORDER — ACETAMINOPHEN 325 MG/1
650 TABLET ORAL ONCE
Status: ACTIVE | OUTPATIENT
Start: 2020-04-27 | End: 2020-04-27

## 2020-04-23 RX ORDER — ACETAMINOPHEN 325 MG/1
650 TABLET ORAL
Status: DISCONTINUED | OUTPATIENT
Start: 2020-04-27 | End: 2020-05-06

## 2020-04-27 ENCOUNTER — HOSPITAL ENCOUNTER (OUTPATIENT)
Dept: INFUSION THERAPY | Age: 50
Discharge: HOME | End: 2020-04-27

## 2020-05-06 RX ORDER — DIPHENHYDRAMINE HYDROCHLORIDE 50 MG/ML
50 INJECTION, SOLUTION INTRAMUSCULAR; INTRAVENOUS
Status: DISCONTINUED | OUTPATIENT
Start: 2020-05-11 | End: 2020-05-12 | Stop reason: HOSPADM

## 2020-05-06 RX ORDER — SODIUM CHLORIDE 9 MG/ML
25 INJECTION, SOLUTION INTRAVENOUS CONTINUOUS
Status: DISCONTINUED | OUTPATIENT
Start: 2020-05-11 | End: 2020-05-12 | Stop reason: HOSPADM

## 2020-05-06 RX ORDER — ACETAMINOPHEN 325 MG/1
650 TABLET ORAL ONCE
Status: ACTIVE | OUTPATIENT
Start: 2020-05-11 | End: 2020-05-11

## 2020-05-06 RX ORDER — ACETAMINOPHEN 325 MG/1
650 TABLET ORAL
Status: DISCONTINUED | OUTPATIENT
Start: 2020-05-11 | End: 2020-05-12 | Stop reason: HOSPADM

## 2020-05-11 ENCOUNTER — HOSPITAL ENCOUNTER (OUTPATIENT)
Dept: INFUSION THERAPY | Age: 50
Discharge: HOME | End: 2020-05-11
Payer: COMMERCIAL

## 2020-05-11 VITALS
DIASTOLIC BLOOD PRESSURE: 79 MMHG | WEIGHT: 192 LBS | TEMPERATURE: 97.2 F | HEART RATE: 74 BPM | BODY MASS INDEX: 30.99 KG/M2 | SYSTOLIC BLOOD PRESSURE: 118 MMHG | RESPIRATION RATE: 18 BRPM

## 2020-05-11 PROCEDURE — 96413 CHEMO IV INFUSION 1 HR: CPT

## 2020-05-11 PROCEDURE — 77030012965 HC NDL HUBR BBMI -A

## 2020-05-11 PROCEDURE — 96365 THER/PROPH/DIAG IV INF INIT: CPT

## 2020-05-11 PROCEDURE — 96415 CHEMO IV INFUSION ADDL HR: CPT

## 2020-05-11 PROCEDURE — 74011250636 HC RX REV CODE- 250/636: Performed by: INTERNAL MEDICINE

## 2020-05-11 PROCEDURE — 96366 THER/PROPH/DIAG IV INF ADDON: CPT

## 2020-05-11 RX ORDER — SODIUM CHLORIDE 0.9 % (FLUSH) 0.9 %
10 SYRINGE (ML) INJECTION AS NEEDED
Status: DISCONTINUED | OUTPATIENT
Start: 2020-05-11 | End: 2020-05-15 | Stop reason: HOSPADM

## 2020-05-11 RX ORDER — SODIUM CHLORIDE 9 MG/ML
10 INJECTION INTRAMUSCULAR; INTRAVENOUS; SUBCUTANEOUS AS NEEDED
Status: DISCONTINUED | OUTPATIENT
Start: 2020-05-11 | End: 2020-05-12 | Stop reason: HOSPADM

## 2020-05-11 RX ORDER — HEPARIN 100 UNIT/ML
500 SYRINGE INTRAVENOUS AS NEEDED
Status: DISCONTINUED | OUTPATIENT
Start: 2020-05-11 | End: 2020-05-12 | Stop reason: HOSPADM

## 2020-05-11 RX ADMIN — Medication 10 ML: at 10:20

## 2020-05-11 RX ADMIN — SODIUM CHLORIDE 10 ML: 9 INJECTION INTRAMUSCULAR; INTRAVENOUS; SUBCUTANEOUS at 10:20

## 2020-05-11 RX ADMIN — SODIUM CHLORIDE 25 ML/HR: 900 INJECTION, SOLUTION INTRAVENOUS at 10:26

## 2020-05-11 RX ADMIN — Medication 500 UNITS: at 13:16

## 2020-05-11 RX ADMIN — SODIUM CHLORIDE 10 ML: 9 INJECTION INTRAMUSCULAR; INTRAVENOUS; SUBCUTANEOUS at 13:16

## 2020-05-11 RX ADMIN — INFLIXIMAB 400 MG: 100 INJECTION, POWDER, LYOPHILIZED, FOR SOLUTION INTRAVENOUS at 11:05

## 2020-05-11 NOTE — PROGRESS NOTES
[facility] VISIT NOTE    1010  Pt arrived at [facility] ambulatory and in no distress for Remicade infusion. Assessment completed, no new complaints at this time. Right chest port accessed with 1 in peoples with no difficulty. Positive blood return noted. Medications received:  Pt refused premeds stating she take her own at home  Remicade IV over 2 hours    Patient Vitals for the past 12 hrs:   Temp Pulse Resp BP   05/11/20 1315  74 18 118/79   05/11/20 1011 97.2 °F (36.2 °C) 74 18 118/79     Tolerated treatment well, no adverse reaction noted. Port de-accessed and flushed per protocol. Positive blood return noted. 1320  D/C'd from [facility] ambulatory and in no distress. Next appointment is 7/6/20.

## 2020-06-22 ENCOUNTER — APPOINTMENT (OUTPATIENT)
Dept: INFUSION THERAPY | Age: 50
End: 2020-06-22

## 2020-06-30 RX ORDER — SODIUM CHLORIDE 9 MG/ML
25 INJECTION, SOLUTION INTRAVENOUS CONTINUOUS
Status: DISCONTINUED | OUTPATIENT
Start: 2020-07-06 | End: 2020-07-07 | Stop reason: HOSPADM

## 2020-06-30 RX ORDER — ACETAMINOPHEN 325 MG/1
650 TABLET ORAL ONCE
Status: ACTIVE | OUTPATIENT
Start: 2020-07-06 | End: 2020-07-06

## 2020-06-30 RX ORDER — DIPHENHYDRAMINE HYDROCHLORIDE 50 MG/ML
50 INJECTION, SOLUTION INTRAMUSCULAR; INTRAVENOUS
Status: DISCONTINUED | OUTPATIENT
Start: 2020-07-06 | End: 2020-07-07 | Stop reason: HOSPADM

## 2020-06-30 RX ORDER — ACETAMINOPHEN 325 MG/1
650 TABLET ORAL
Status: DISCONTINUED | OUTPATIENT
Start: 2020-07-06 | End: 2020-07-07 | Stop reason: HOSPADM

## 2020-07-06 ENCOUNTER — HOSPITAL ENCOUNTER (OUTPATIENT)
Dept: INFUSION THERAPY | Age: 50
Discharge: HOME | End: 2020-07-06
Payer: COMMERCIAL

## 2020-07-06 VITALS
TEMPERATURE: 98 F | RESPIRATION RATE: 18 BRPM | WEIGHT: 195.33 LBS | DIASTOLIC BLOOD PRESSURE: 83 MMHG | SYSTOLIC BLOOD PRESSURE: 122 MMHG | BODY MASS INDEX: 31.53 KG/M2 | HEART RATE: 77 BPM

## 2020-07-06 PROCEDURE — 96365 THER/PROPH/DIAG IV INF INIT: CPT

## 2020-07-06 PROCEDURE — 96413 CHEMO IV INFUSION 1 HR: CPT

## 2020-07-06 PROCEDURE — 96366 THER/PROPH/DIAG IV INF ADDON: CPT

## 2020-07-06 PROCEDURE — 96415 CHEMO IV INFUSION ADDL HR: CPT

## 2020-07-06 PROCEDURE — 77030012965 HC NDL HUBR BBMI -A

## 2020-07-06 PROCEDURE — 74011250636 HC RX REV CODE- 250/636: Performed by: INTERNAL MEDICINE

## 2020-07-06 RX ORDER — SODIUM CHLORIDE 0.9 % (FLUSH) 0.9 %
5-10 SYRINGE (ML) INJECTION AS NEEDED
Status: DISCONTINUED | OUTPATIENT
Start: 2020-07-06 | End: 2020-07-07 | Stop reason: HOSPADM

## 2020-07-06 RX ADMIN — INFLIXIMAB 400 MG: 100 INJECTION, POWDER, LYOPHILIZED, FOR SOLUTION INTRAVENOUS at 10:58

## 2020-07-06 NOTE — PROGRESS NOTES
Pt arrived to [facility] ambulatory in no acute distress at 1005 for Remicade.  Assessment unremarkable. L chest port accessed without issue and positive blood return noted. No labs ordered. Patient Vitals for the past 12 hrs:   Temp Pulse Resp BP   07/06/20 1311  77 18 122/83   07/06/20 1009 98 °F (36.7 °C) 79 18 122/74       The following medications administered:  Medications Administered     inFLIXimab (REMICADE) 400 mg in 0.9% sodium chloride 250 mL, overfill volume 25 mL infusion     Admin Date  07/06/2020 Action  New Bag Dose  400 mg Route  IntraVENous Administered By  Minoo Bernstein                Pt tolerated treatment well. Port flushed per policy, peoples needle removed, 2x2 and tape placed.  Pt discharged ambulatory in no acute distress at 1320, accompanied by self. Next appointment 8/31/2020 at 1000.

## 2020-08-17 ENCOUNTER — APPOINTMENT (OUTPATIENT)
Dept: INFUSION THERAPY | Age: 50
End: 2020-08-17
Payer: COMMERCIAL

## 2020-09-01 ENCOUNTER — EMPLOYEE WELLNESS (OUTPATIENT)
Dept: OTHER | Facility: CLINIC | Age: 50
End: 2020-09-01

## 2020-09-01 LAB
CHOLEST SERPL-MCNC: 189 MG/DL
GLUCOSE SERPL-MCNC: 83 MG/DL (ref 65–100)
HDLC SERPL-MCNC: 73 MG/DL
LDLC SERPL CALC-MCNC: 100.2 MG/DL (ref 0–100)
TRIGL SERPL-MCNC: 79 MG/DL (ref ?–150)

## 2020-09-16 RX ORDER — ACETAMINOPHEN 325 MG/1
650 TABLET ORAL ONCE
Status: ACTIVE | OUTPATIENT
Start: 2020-09-21 | End: 2020-09-21

## 2020-09-16 RX ORDER — DIPHENHYDRAMINE HYDROCHLORIDE 50 MG/ML
50 INJECTION, SOLUTION INTRAMUSCULAR; INTRAVENOUS
Status: DISCONTINUED | OUTPATIENT
Start: 2020-09-21 | End: 2020-09-22 | Stop reason: HOSPADM

## 2020-09-16 RX ORDER — ACETAMINOPHEN 325 MG/1
650 TABLET ORAL
Status: DISCONTINUED | OUTPATIENT
Start: 2020-09-21 | End: 2020-09-22 | Stop reason: HOSPADM

## 2020-09-16 RX ORDER — SODIUM CHLORIDE 9 MG/ML
25 INJECTION, SOLUTION INTRAVENOUS CONTINUOUS
Status: DISCONTINUED | OUTPATIENT
Start: 2020-09-21 | End: 2020-09-22 | Stop reason: HOSPADM

## 2020-09-21 ENCOUNTER — HOSPITAL ENCOUNTER (OUTPATIENT)
Dept: INFUSION THERAPY | Age: 50
Discharge: HOME | End: 2020-09-21
Payer: COMMERCIAL

## 2020-09-21 VITALS
TEMPERATURE: 97.1 F | RESPIRATION RATE: 18 BRPM | HEIGHT: 66 IN | HEART RATE: 84 BPM | BODY MASS INDEX: 32.98 KG/M2 | SYSTOLIC BLOOD PRESSURE: 113 MMHG | DIASTOLIC BLOOD PRESSURE: 78 MMHG | WEIGHT: 205.2 LBS

## 2020-09-21 PROCEDURE — 74011250636 HC RX REV CODE- 250/636: Performed by: INTERNAL MEDICINE

## 2020-09-21 PROCEDURE — 96413 CHEMO IV INFUSION 1 HR: CPT

## 2020-09-21 PROCEDURE — 77030012965 HC NDL HUBR BBMI -A

## 2020-09-21 PROCEDURE — 96365 THER/PROPH/DIAG IV INF INIT: CPT

## 2020-09-21 PROCEDURE — 96366 THER/PROPH/DIAG IV INF ADDON: CPT

## 2020-09-21 PROCEDURE — 96415 CHEMO IV INFUSION ADDL HR: CPT

## 2020-09-21 RX ADMIN — INFLIXIMAB 400 MG: 100 INJECTION, POWDER, LYOPHILIZED, FOR SOLUTION INTRAVENOUS at 11:11

## 2020-09-21 RX ADMIN — SODIUM CHLORIDE 25 ML/HR: 900 INJECTION, SOLUTION INTRAVENOUS at 11:14

## 2020-10-26 ENCOUNTER — HOSPITAL ENCOUNTER (OUTPATIENT)
Dept: INFUSION THERAPY | Age: 50
Discharge: HOME | End: 2020-10-26

## 2020-12-04 RX ORDER — ACETAMINOPHEN 325 MG/1
650 TABLET ORAL
Status: DISCONTINUED | OUTPATIENT
Start: 2020-12-07 | End: 2020-12-08 | Stop reason: HOSPADM

## 2020-12-04 RX ORDER — SODIUM CHLORIDE 9 MG/ML
25 INJECTION, SOLUTION INTRAVENOUS CONTINUOUS
Status: DISCONTINUED | OUTPATIENT
Start: 2020-12-07 | End: 2020-12-08 | Stop reason: HOSPADM

## 2020-12-04 RX ORDER — DIPHENHYDRAMINE HYDROCHLORIDE 50 MG/ML
50 INJECTION, SOLUTION INTRAMUSCULAR; INTRAVENOUS
Status: DISCONTINUED | OUTPATIENT
Start: 2020-12-07 | End: 2020-12-08 | Stop reason: HOSPADM

## 2020-12-04 RX ORDER — ACETAMINOPHEN 325 MG/1
650 TABLET ORAL ONCE
Status: DISCONTINUED | OUTPATIENT
Start: 2020-12-07 | End: 2020-12-08 | Stop reason: HOSPADM

## 2020-12-07 ENCOUNTER — HOSPITAL ENCOUNTER (OUTPATIENT)
Dept: INFUSION THERAPY | Age: 50
Discharge: HOME | End: 2020-12-07
Payer: COMMERCIAL

## 2020-12-07 VITALS
SYSTOLIC BLOOD PRESSURE: 137 MMHG | WEIGHT: 202 LBS | RESPIRATION RATE: 16 BRPM | DIASTOLIC BLOOD PRESSURE: 88 MMHG | HEART RATE: 82 BPM | TEMPERATURE: 96.8 F | BODY MASS INDEX: 32.6 KG/M2

## 2020-12-07 PROCEDURE — 74011250636 HC RX REV CODE- 250/636: Performed by: INTERNAL MEDICINE

## 2020-12-07 PROCEDURE — 96413 CHEMO IV INFUSION 1 HR: CPT

## 2020-12-07 PROCEDURE — 96366 THER/PROPH/DIAG IV INF ADDON: CPT

## 2020-12-07 PROCEDURE — 96415 CHEMO IV INFUSION ADDL HR: CPT

## 2020-12-07 PROCEDURE — 96365 THER/PROPH/DIAG IV INF INIT: CPT

## 2020-12-07 PROCEDURE — 77030012965 HC NDL HUBR BBMI -A

## 2020-12-07 RX ADMIN — SODIUM CHLORIDE 25 ML/HR: 900 INJECTION, SOLUTION INTRAVENOUS at 16:18

## 2020-12-07 RX ADMIN — INFLIXIMAB 500 MG: 100 INJECTION, POWDER, LYOPHILIZED, FOR SOLUTION INTRAVENOUS at 16:17

## 2020-12-07 NOTE — PROGRESS NOTES
[facility] Outpatient Infusion Center Note:  1500  Pt arrived at [facility] ambulatory and in no distress for remicade. Assessment stable, no new complaints voiced. Pt is a cardiac rehab nurse. Medications received:  Medications Administered     0.9% sodium chloride infusion     Admin Date  12/07/2020 Action  New Bag Dose  25 mL/hr Rate  25 mL/hr Route  IntraVENous Administered By  May Brown RN          inFLIXimab (REMICADE) 500 mg in 0.9% sodium chloride 250 mL, overfill volume 25 mL infusion     Admin Date  12/07/2020 Action  New Bag Dose  500 mg Route  IntraVENous Administered By  May Brown RN              Pt took own pre meds prior to coming. [facility] well, no adverse reaction noted. D/Cd from [facility] ambulatory and in no distress accompanied by self. Next appt 2/1  1500  Visit Vitals  /88   Pulse 82   Temp 96.8 °F (36 °C)   Resp 16   Wt 91.6 kg (202 lb)   BMI 32.60 kg/m²     No results found for this or any previous visit (from the past 12 hour(s)).

## 2020-12-10 ENCOUNTER — APPOINTMENT (OUTPATIENT)
Dept: INFUSION THERAPY | Age: 50
End: 2020-12-10

## 2020-12-21 ENCOUNTER — APPOINTMENT (OUTPATIENT)
Dept: INFUSION THERAPY | Age: 50
End: 2020-12-21

## 2021-01-11 ENCOUNTER — APPOINTMENT (OUTPATIENT)
Dept: INFUSION THERAPY | Age: 51
End: 2021-01-11

## 2021-01-27 RX ORDER — SODIUM CHLORIDE 9 MG/ML
25 INJECTION, SOLUTION INTRAVENOUS CONTINUOUS
Status: DISCONTINUED | OUTPATIENT
Start: 2021-02-01 | End: 2021-02-02 | Stop reason: HOSPADM

## 2021-01-27 RX ORDER — DIPHENHYDRAMINE HYDROCHLORIDE 50 MG/ML
50 INJECTION, SOLUTION INTRAMUSCULAR; INTRAVENOUS
Status: DISCONTINUED | OUTPATIENT
Start: 2021-02-01 | End: 2021-02-02 | Stop reason: HOSPADM

## 2021-01-27 RX ORDER — ACETAMINOPHEN 325 MG/1
650 TABLET ORAL ONCE
Status: COMPLETED | OUTPATIENT
Start: 2021-02-01 | End: 2021-02-01

## 2021-01-27 RX ORDER — ACETAMINOPHEN 325 MG/1
650 TABLET ORAL
Status: DISCONTINUED | OUTPATIENT
Start: 2021-02-01 | End: 2021-02-02 | Stop reason: HOSPADM

## 2021-02-01 ENCOUNTER — HOSPITAL ENCOUNTER (OUTPATIENT)
Dept: INFUSION THERAPY | Age: 51
Discharge: HOME | End: 2021-02-01
Payer: COMMERCIAL

## 2021-02-01 VITALS
DIASTOLIC BLOOD PRESSURE: 77 MMHG | RESPIRATION RATE: 16 BRPM | SYSTOLIC BLOOD PRESSURE: 124 MMHG | HEART RATE: 87 BPM | TEMPERATURE: 96.2 F

## 2021-02-01 PROCEDURE — 96366 THER/PROPH/DIAG IV INF ADDON: CPT

## 2021-02-01 PROCEDURE — 96365 THER/PROPH/DIAG IV INF INIT: CPT

## 2021-02-01 PROCEDURE — 74011250636 HC RX REV CODE- 250/636: Performed by: INTERNAL MEDICINE

## 2021-02-01 PROCEDURE — 96375 TX/PRO/DX INJ NEW DRUG ADDON: CPT

## 2021-02-01 PROCEDURE — 77030012965 HC NDL HUBR BBMI -A

## 2021-02-01 PROCEDURE — 74011250637 HC RX REV CODE- 250/637: Performed by: INTERNAL MEDICINE

## 2021-02-01 RX ADMIN — SODIUM CHLORIDE 25 ML/HR: 900 INJECTION, SOLUTION INTRAVENOUS at 15:15

## 2021-02-01 RX ADMIN — ACETAMINOPHEN 650 MG: 325 TABLET ORAL at 15:14

## 2021-02-01 RX ADMIN — INFLIXIMAB 100 MG: 100 INJECTION, POWDER, LYOPHILIZED, FOR SOLUTION INTRAVENOUS at 15:52

## 2021-02-01 RX ADMIN — DIPHENHYDRAMINE HYDROCHLORIDE 50 MG: 50 INJECTION INTRAMUSCULAR; INTRAVENOUS at 15:19

## 2021-02-01 NOTE — PROGRESS NOTES
[facility] Short Note                       Date: 2021    Name: Yandel Edmonds    MRN: [id number]         : 1970      1455 Pt admit to [facility] for Remicade ambulatory in stable condition. Assessment completed. No new concerns voiced. Patient denies SOB, fever, cough, and/or general not feeling well. Patient denies recent exposure to someone who has tested positive for COVID-19. Patient denies contact with anyone who has a pending COVID test. Port accessed without difficulty, positive for blood return. Port connected to [employer]. Patient states she usually takes her pre-medications prior to coming to the infusion center. Patient states she forgot to do this today. Patient states she generally takes benadryl + tylenol. Spoke with Yesy Crump PharmD regarding benadryl order. Tylenol & IV benadryl given (in [facility] it is listed as for use with infusion reaction or \"other\", RN gave this as patient did not take her pre-medications today and this was the only benadryl ordered.) Patient voiced understanding and has no questions at this time. Ms. Estee Nunez vitals were reviewed prior to and after treatment.    Patient Vitals for the past 12 hrs:   Temp Pulse Resp BP   21 1455 (!) 96.2 °F (35.7 °C) 90 16 (!) 146/86       Medications Administered     0.9% sodium chloride infusion     Admin Date  2021 Action  New Bag Dose  25 mL/hr Rate  25 mL/hr Route  IntraVENous Administered By  Vianey Barclay RN          acetaminophen (TYLENOL) tablet 650 mg     Admin Date  2021 Action  Given Dose  650 mg Route  Oral Administered By  Vianey Barclay RN          diphenhydrAMINE (BENADRYL) injection 50 mg     Admin Date  2021 Action  Given Dose  50 mg Route  IntraVENous Administered By  Vianey Barclay RN          inFLIXimab (REMICADE) 100 mg in 0.9% sodium chloride 250 mL, overfill volume 25 mL infusion     Admin Date  2021 Action  New Bag Dose  100 mg Route  IntraVENous Administered By  Vianey Barclay RN                Ms. Saenz Patient tolerated the infusion, and had no complaints. Port flushed, heparinized and de-accessed per protocol. Ms. Saenz Patient was discharged from [place] [address] in stable condition at 1810. Patient is aware of next appointment.     Future Appointments   Date Time Provider Bhumi Wolf   3/29/2021  3:00 PM [address] (A [facility]AMP[facility] OF [facility]) LONG [facility] NATHAN Bajwa  February 1, 2021  5:40 PM

## 2021-02-24 ENCOUNTER — OFFICE VISIT (OUTPATIENT)
Dept: PRIMARY CARE CLINIC | Age: 51
End: 2021-02-24
Payer: COMMERCIAL

## 2021-02-24 VITALS
OXYGEN SATURATION: 98 % | BODY MASS INDEX: 32.62 KG/M2 | HEIGHT: 66 IN | WEIGHT: 203 LBS | SYSTOLIC BLOOD PRESSURE: 123 MMHG | HEART RATE: 87 BPM | RESPIRATION RATE: 16 BRPM | TEMPERATURE: 96 F | DIASTOLIC BLOOD PRESSURE: 82 MMHG

## 2021-02-24 DIAGNOSIS — T36.95XA ANTIBIOTIC-INDUCED YEAST INFECTION: ICD-10-CM

## 2021-02-24 DIAGNOSIS — R35.0 FREQUENT URINATION: ICD-10-CM

## 2021-02-24 DIAGNOSIS — N30.91 CYSTITIS WITH HEMATURIA: Primary | ICD-10-CM

## 2021-02-24 DIAGNOSIS — B37.9 ANTIBIOTIC-INDUCED YEAST INFECTION: ICD-10-CM

## 2021-02-24 LAB
BILIRUB UR QL STRIP: NEGATIVE
GLUCOSE UR-MCNC: NEGATIVE MG/DL
KETONES P FAST UR STRIP-MCNC: NEGATIVE MG/DL
PH UR STRIP: 6 [PH] (ref 4.6–8)
PROT UR QL STRIP: NORMAL
SP GR UR STRIP: 1.02 (ref 1–1.03)
UA UROBILINOGEN AMB POC: NORMAL (ref 0.2–1)
URINALYSIS CLARITY POC: NORMAL
URINALYSIS COLOR POC: YELLOW
URINE BLOOD POC: NORMAL
URINE LEUKOCYTES POC: NORMAL
URINE NITRITES POC: NEGATIVE

## 2021-02-24 PROCEDURE — 99213 OFFICE O/P EST LOW 20 MIN: CPT | Performed by: NURSE PRACTITIONER

## 2021-02-24 PROCEDURE — 81003 URINALYSIS AUTO W/O SCOPE: CPT | Performed by: NURSE PRACTITIONER

## 2021-02-24 RX ORDER — FLUCONAZOLE 150 MG/1
150 TABLET ORAL DAILY
Qty: 1 TAB | Refills: 0 | Status: SHIPPED | OUTPATIENT
Start: 2021-02-24 | End: 2021-02-25

## 2021-02-24 RX ORDER — NITROFURANTOIN 25; 75 MG/1; MG/1
100 CAPSULE ORAL 2 TIMES DAILY
Qty: 10 CAP | Refills: 0 | Status: SHIPPED | OUTPATIENT
Start: 2021-02-24 | End: 2021-03-01

## 2021-02-24 RX ORDER — PHENAZOPYRIDINE HYDROCHLORIDE 100 MG/1
100 TABLET, FILM COATED ORAL
Qty: 9 TAB | Refills: 0 | Status: SHIPPED | OUTPATIENT
Start: 2021-02-24 | End: 2021-02-27

## 2021-02-24 NOTE — PROGRESS NOTES
HISTORY OF PRESENT ILLNESS  Ned Car is a 53 y.o. female. Patient presents with a 5 day history of urgency, burning and back pain. Started with frequency which progressed. Today burning and frequency + right LBP. Denies fever or history of frequent UTI. Is on Remicade infusions for Crohns Ds. X 10 years. Recent labs all WNL. Past Medical History:   Diagnosis Date    Asthma     GERD (gastroesophageal reflux disease)     Ulcerative colitis     Ulcerative colitis ([address]      Past Surgical History:   Procedure Laterality Date    HX LIPOSUCTION      HX VASCULAR ACCESS      removed in 2008     Allergies   Allergen Reactions    Banana Hives     Vomiting      Ibuprofen Other (comments)    Nsaids (Non-Steroidal Anti-Inflammatory Drug) Other (comments)     Exacerbation of Ulcerative Colitis    Sulfa (Sulfonamide Antibiotics) Nausea and Vomiting    Sulfa (Sulfonamide Antibiotics) Hives       Review of Systems   Constitutional: Negative for chills, fever and malaise/fatigue. Respiratory: Negative for cough. Cardiovascular: Negative for chest pain. Gastrointestinal: Negative for abdominal pain, constipation, nausea and vomiting. Physical Exam  Vitals signs and nursing note reviewed. Constitutional:       General: She is not in acute distress. Appearance: Normal appearance. HENT:      Head: Normocephalic and atraumatic. Eyes:      Pupils: Pupils are equal, round, and reactive to light. Cardiovascular:      Rate and Rhythm: Normal rate. Pulmonary:      Effort: Pulmonary effort is normal.   Abdominal:      Tenderness: There is no right CVA tenderness or left CVA tenderness. Skin:     General: Skin is warm. Neurological:      Mental Status: She is alert.        Results for orders placed or performed in visit on 02/24/21   AMB POC URINALYSIS DIP STICK MANUAL W/O MICRO   Result Value Ref Range    Color (UA POC) Yellow     Clarity (UA POC) Slightly Cloudy     Glucose (UA POC) Negative Negative    Bilirubin (UA POC) Negative Negative    Ketones (UA POC) Negative Negative    Specific gravity (UA POC) 1.020 1.001 - 1.035    Blood (UA POC) 2+ Negative    pH (UA POC) 6.0 4.6 - 8.0    Protein (UA POC) 2+ Negative    Urobilinogen (UA POC) 0.2 mg/dL 0.2 - 1    Nitrites (UA POC) Negative Negative    Leukocyte esterase (UA POC) Trace Negative       ASSESSMENT and PLAN    ICD-10-CM ICD-9-CM    1. Cystitis with hematuria  N30.91 595.9 CULTURE, URINE   2. Antibiotic-induced yeast infection  B37.9 112.9     T36.95XA E930.9    3. Frequent urination  R35.0 788.41 AMB POC URINALYSIS DIP STICK MANUAL W/O MICRO     Encounter Diagnoses   Name Primary?  Cystitis with hematuria Yes    Antibiotic-induced yeast infection     Frequent urination      Orders Placed This Encounter    CULTURE, URINE    AMB POC URINALYSIS DIP STICK MANUAL W/O MICRO    multivit with iron,minerals (MULTIVITAMIN AND MINERALS PO)    phenazopyridine (PYRIDIUM) 100 mg tablet    nitrofurantoin, macrocrystal-monohydrate, (MACROBID) 100 mg capsule    fluconazole (DIFLUCAN) 150 mg tablet   Medication as directed   Increase fluids  Urine culture sent and will be available on [email or web address]. It was a pleasure to see you in the office today. Please call if you have any further questions or concerns. I am available through the portal system.      Signed By: DARCY Govea     February 24, 2021

## 2021-02-24 NOTE — PROGRESS NOTES
RM 5    Chief Complaint   Patient presents with    UTI     saturday morning back pain burning with urination frequent urination        Visit Vitals  /82 (BP 1 Location: Right arm, BP Patient Position: Sitting)   Pulse 87   Temp (!) 96 °F (35.6 °C) (Skin)   Resp 16   Ht 5' 6\" (1.676 m)   Wt 203 lb (92.1 kg)   SpO2 98%   BMI 32.77 kg/m²

## 2021-02-24 NOTE — PATIENT INSTRUCTIONS
Urinary Tract Infection in Women: Care Instructions Your Care Instructions A urinary tract infection, or UTI, is a general term for an infection anywhere between the kidneys and the urethra (where urine comes out). Most UTIs are bladder infections. They often cause pain or burning when you urinate. UTIs are caused by bacteria and can be cured with antibiotics. Be sure to complete your treatment so that the infection goes away. Follow-up care is a key part of your treatment and safety. Be sure to make and go to all appointments, and call your doctor if you are having problems. It's also a good idea to know your test results and keep a list of the medicines you take. How can you care for yourself at home? · Take your antibiotics as directed. Do not stop taking them just because you feel better. You need to take the full course of antibiotics. · Drink extra water and other fluids for the next day or two. This may help wash out the bacteria that are causing the infection. (If you have kidney, heart, or liver disease and have to limit fluids, talk with your doctor before you increase your fluid intake.) · Avoid drinks that are carbonated or have caffeine. They can irritate the bladder. · Urinate often. Try to empty your bladder each time. · To relieve pain, take a hot bath or lay a heating pad set on low over your lower belly or genital area. Never go to sleep with a heating pad in place. To prevent UTIs · Drink plenty of water each day. This helps you urinate often, which clears bacteria from your system. (If you have kidney, heart, or liver disease and have to limit fluids, talk with your doctor before you increase your fluid intake.) · Urinate when you need to. · Urinate right after you have sex. · Change sanitary pads often. · Avoid douches, bubble baths, feminine hygiene sprays, and other feminine hygiene products that have deodorants. · After going to the bathroom, wipe from front to back. When should you call for help? Call your doctor now or seek immediate medical care if: 
  · Symptoms such as fever, chills, nausea, or vomiting get worse or appear for the first time.  
  · You have new pain in your back just below your rib cage. This is called flank pain.  
  · There is new blood or pus in your urine.  
  · You have any problems with your antibiotic medicine. Watch closely for changes in your health, and be sure to contact your doctor if: 
  · You are not getting better after taking an antibiotic for 2 days.  
  · Your symptoms go away but then come back. Where can you learn more? Go to http://www.gray.com/ Enter F142 in the search box to learn more about \"Urinary Tract Infection in Women: Care Instructions. \" Current as of: June 29, 2020               Content Version: 12.6 © 1189-0162 [employer]. Care instructions adapted under license by [employer] (which disclaims liability or warranty for this information). If you have questions about a medical condition or this instruction, always ask your healthcare professional. [address] any warranty or liability for your use of this information.

## 2021-03-01 LAB — BACTERIA UR CULT: ABNORMAL

## 2021-03-08 ENCOUNTER — APPOINTMENT (OUTPATIENT)
Dept: INFUSION THERAPY | Age: 51
End: 2021-03-08
Payer: COMMERCIAL

## 2021-03-24 RX ORDER — DIPHENHYDRAMINE HYDROCHLORIDE 50 MG/ML
50 INJECTION, SOLUTION INTRAMUSCULAR; INTRAVENOUS
Status: DISCONTINUED | OUTPATIENT
Start: 2021-03-29 | End: 2021-03-29

## 2021-03-24 RX ORDER — ACETAMINOPHEN 325 MG/1
650 TABLET ORAL ONCE
Status: DISCONTINUED | OUTPATIENT
Start: 2021-03-29 | End: 2021-03-29

## 2021-03-24 RX ORDER — SODIUM CHLORIDE 9 MG/ML
25 INJECTION, SOLUTION INTRAVENOUS CONTINUOUS
Status: DISCONTINUED | OUTPATIENT
Start: 2021-03-29 | End: 2021-03-29

## 2021-03-24 RX ORDER — ACETAMINOPHEN 325 MG/1
650 TABLET ORAL
Status: DISCONTINUED | OUTPATIENT
Start: 2021-03-29 | End: 2021-03-29

## 2021-03-29 ENCOUNTER — HOSPITAL ENCOUNTER (OUTPATIENT)
Dept: INFUSION THERAPY | Age: 51
End: 2021-03-29

## 2021-03-29 ENCOUNTER — HOSPITAL ENCOUNTER (OUTPATIENT)
Dept: INFUSION THERAPY | Age: 51
Discharge: HOME | End: 2021-03-29

## 2021-03-29 RX ORDER — ACETAMINOPHEN 325 MG/1
650 TABLET ORAL ONCE
Status: DISCONTINUED | OUTPATIENT
Start: 2021-04-01 | End: 2021-04-01

## 2021-03-29 RX ORDER — SODIUM CHLORIDE 9 MG/ML
25 INJECTION, SOLUTION INTRAVENOUS CONTINUOUS
Status: DISCONTINUED | OUTPATIENT
Start: 2021-04-01 | End: 2021-04-02 | Stop reason: HOSPADM

## 2021-03-29 RX ORDER — ACETAMINOPHEN 325 MG/1
650 TABLET ORAL
Status: DISCONTINUED | OUTPATIENT
Start: 2021-04-01 | End: 2021-04-01

## 2021-03-29 RX ORDER — DIPHENHYDRAMINE HYDROCHLORIDE 50 MG/ML
50 INJECTION, SOLUTION INTRAMUSCULAR; INTRAVENOUS
Status: DISCONTINUED | OUTPATIENT
Start: 2021-04-01 | End: 2021-04-01

## 2021-04-01 ENCOUNTER — HOSPITAL ENCOUNTER (OUTPATIENT)
Dept: INFUSION THERAPY | Age: 51
Discharge: HOME | End: 2021-04-01
Payer: COMMERCIAL

## 2021-04-01 VITALS
RESPIRATION RATE: 16 BRPM | TEMPERATURE: 98 F | HEART RATE: 70 BPM | SYSTOLIC BLOOD PRESSURE: 119 MMHG | DIASTOLIC BLOOD PRESSURE: 70 MMHG

## 2021-04-01 PROCEDURE — 74011250636 HC RX REV CODE- 250/636: Performed by: INTERNAL MEDICINE

## 2021-04-01 PROCEDURE — 96413 CHEMO IV INFUSION 1 HR: CPT

## 2021-04-01 RX ADMIN — INFLIXIMAB 100 MG: 100 INJECTION, POWDER, LYOPHILIZED, FOR SOLUTION INTRAVENOUS at 14:07

## 2021-04-01 RX ADMIN — SODIUM CHLORIDE 25 ML/HR: 900 INJECTION, SOLUTION INTRAVENOUS at 13:35

## 2021-04-01 NOTE — PROGRESS NOTES
[address] [facility]\"" @ [facility] Visit Note    1300 Patient arrives for Remicade Infusion without acute problems. Please see [facility] for complete assessment and education provided. Prior to treatment, patient was screened for COVID 19. Patient denies any signs or symptoms of COVID. Denies any known physical contact with anyone diagnosed with, or with pending or positive COVID test. Denies a pending or positive COVID test himself. Vital signs stable throughout and prior to discharge. Patient tolerated procedure well and was discharged without incident. Patient is aware of next \[facility]\"" appointment on 05/24/2021 Appointment card give to the Patient    Medications verified by Alva Church RN  via [email or web address]:    1. Infliximab 100 mg   2.    NS KVO    Vital signs:   Patient Vitals for the past 12 hrs:   Temp Pulse Resp BP   04/01/21 1625 98 °F (36.7 °C) 70 16 119/70   04/01/21 1518  82 16 130/83   04/01/21 1257 97.4 °F (36.3 °C) 96 16 (!) 138/98

## 2021-05-24 ENCOUNTER — APPOINTMENT (OUTPATIENT)
Dept: INFUSION THERAPY | Age: 51
End: 2021-05-24

## 2021-05-24 RX ORDER — DIPHENHYDRAMINE HYDROCHLORIDE 50 MG/ML
50 INJECTION, SOLUTION INTRAMUSCULAR; INTRAVENOUS
Status: DISCONTINUED | OUTPATIENT
Start: 2021-05-27 | End: 2021-05-28 | Stop reason: HOSPADM

## 2021-05-24 RX ORDER — SODIUM CHLORIDE 9 MG/ML
25 INJECTION, SOLUTION INTRAVENOUS CONTINUOUS
Status: DISCONTINUED | OUTPATIENT
Start: 2021-05-27 | End: 2021-05-28 | Stop reason: HOSPADM

## 2021-05-24 RX ORDER — ACETAMINOPHEN 325 MG/1
650 TABLET ORAL ONCE
Status: ACTIVE | OUTPATIENT
Start: 2021-05-27 | End: 2021-05-27

## 2021-05-24 RX ORDER — ACETAMINOPHEN 325 MG/1
650 TABLET ORAL
Status: DISCONTINUED | OUTPATIENT
Start: 2021-05-27 | End: 2021-05-28 | Stop reason: HOSPADM

## 2021-05-27 ENCOUNTER — HOSPITAL ENCOUNTER (OUTPATIENT)
Dept: INFUSION THERAPY | Age: 51
Discharge: HOME | End: 2021-05-27
Payer: COMMERCIAL

## 2021-05-27 VITALS
TEMPERATURE: 97.9 F | HEART RATE: 88 BPM | RESPIRATION RATE: 16 BRPM | WEIGHT: 201 LBS | DIASTOLIC BLOOD PRESSURE: 83 MMHG | BODY MASS INDEX: 32.44 KG/M2 | SYSTOLIC BLOOD PRESSURE: 125 MMHG

## 2021-05-27 PROCEDURE — 96365 THER/PROPH/DIAG IV INF INIT: CPT

## 2021-05-27 PROCEDURE — 74011250636 HC RX REV CODE- 250/636: Performed by: INTERNAL MEDICINE

## 2021-05-27 PROCEDURE — 77030012965 HC NDL HUBR BBMI -A

## 2021-05-27 PROCEDURE — 96366 THER/PROPH/DIAG IV INF ADDON: CPT

## 2021-05-27 RX ADMIN — INFLIXIMAB 100 MG: 100 INJECTION, POWDER, LYOPHILIZED, FOR SOLUTION INTRAVENOUS at 16:05

## 2021-05-27 RX ADMIN — SODIUM CHLORIDE 25 ML/HR: 900 INJECTION, SOLUTION INTRAVENOUS at 15:19

## 2021-05-27 NOTE — PROGRESS NOTES
Outpatient Infusion Center - Chemotherapy Progress Note    1500 Pt admit to [facility] for Remicade ambulatory in stable condition. Assessment completed. No new concerns voiced. Port accessed with positive blood return. Line flushed, NS infusing. Pt took own pre-meds prior to [facility] visit. Patient denies SOB, fever, cough, general not feeling well. Patient denies recent exposure to someone who has tested positive for COVID-19. Patient  denies having contact with anyone who has a pending COVID test.     Visit Vitals  /83   Pulse 88   Temp 97.9 °F (36.6 °C)   Resp 16   Wt 91.2 kg (201 lb)   Breastfeeding No   BMI 32.44 kg/m²       Medications Administered     0.9% sodium chloride infusion     Admin Date  05/27/2021 Action  New Bag Dose  25 mL/hr Rate  25 mL/hr Route  IntraVENous Administered By  Attila Aviles RN          inFLIXimab (REMICADE) 100 mg in 0.9% sodium chloride 250 mL, overfill volume 25 mL infusion     Admin Date  05/27/2021 Action  New Bag Dose  100 mg Route  IntraVENous Administered By  Attila Aviles, NATHAN                  1827 Pt tolerated treatment well. Port maintained positive blood return throughout treatment, flushed with positive blood return at conclusion, heparinized and de-accessed. D/c home ambulatory in no distress. Pt aware of next [facility] appointment scheduled for 07/19/2021.

## 2021-07-14 RX ORDER — ACETAMINOPHEN 325 MG/1
650 TABLET ORAL ONCE
Status: ACTIVE | OUTPATIENT
Start: 2021-07-19 | End: 2021-07-19

## 2021-07-14 RX ORDER — SODIUM CHLORIDE 9 MG/ML
25 INJECTION, SOLUTION INTRAVENOUS CONTINUOUS
Status: DISPENSED | OUTPATIENT
Start: 2021-07-19 | End: 2021-07-19

## 2021-07-14 RX ORDER — ACETAMINOPHEN 325 MG/1
650 TABLET ORAL
Status: ACTIVE | OUTPATIENT
Start: 2021-07-19 | End: 2021-07-19

## 2021-07-14 RX ORDER — DIPHENHYDRAMINE HYDROCHLORIDE 50 MG/ML
50 INJECTION, SOLUTION INTRAMUSCULAR; INTRAVENOUS
Status: ACTIVE | OUTPATIENT
Start: 2021-07-19 | End: 2021-07-19

## 2021-07-19 ENCOUNTER — HOSPITAL ENCOUNTER (OUTPATIENT)
Dept: INFUSION THERAPY | Age: 51
Discharge: HOME | End: 2021-07-19

## 2021-07-23 ENCOUNTER — APPOINTMENT (OUTPATIENT)
Dept: INFUSION THERAPY | Age: 51
End: 2021-07-23

## 2021-07-26 RX ORDER — SODIUM CHLORIDE 9 MG/ML
25 INJECTION, SOLUTION INTRAVENOUS CONTINUOUS
Status: DISPENSED | OUTPATIENT
Start: 2021-07-29 | End: 2021-07-29

## 2021-07-26 RX ORDER — DIPHENHYDRAMINE HYDROCHLORIDE 50 MG/ML
50 INJECTION, SOLUTION INTRAMUSCULAR; INTRAVENOUS
Status: ACTIVE | OUTPATIENT
Start: 2021-07-29 | End: 2021-07-29

## 2021-07-26 RX ORDER — ACETAMINOPHEN 325 MG/1
650 TABLET ORAL
Status: ACTIVE | OUTPATIENT
Start: 2021-07-29 | End: 2021-07-29

## 2021-07-26 RX ORDER — ACETAMINOPHEN 325 MG/1
650 TABLET ORAL ONCE
Status: ACTIVE | OUTPATIENT
Start: 2021-07-29 | End: 2021-07-29

## 2021-07-29 ENCOUNTER — HOSPITAL ENCOUNTER (OUTPATIENT)
Dept: INFUSION THERAPY | Age: 51
Discharge: HOME | End: 2021-07-29

## 2021-07-30 RX ORDER — SODIUM CHLORIDE 9 MG/ML
25 INJECTION, SOLUTION INTRAVENOUS CONTINUOUS
Status: DISCONTINUED | OUTPATIENT
Start: 2021-08-04 | End: 2021-08-05 | Stop reason: HOSPADM

## 2021-07-30 RX ORDER — DIPHENHYDRAMINE HYDROCHLORIDE 50 MG/ML
50 INJECTION, SOLUTION INTRAMUSCULAR; INTRAVENOUS
Status: DISCONTINUED | OUTPATIENT
Start: 2021-08-04 | End: 2021-08-05 | Stop reason: HOSPADM

## 2021-07-30 RX ORDER — ACETAMINOPHEN 325 MG/1
650 TABLET ORAL
Status: DISCONTINUED | OUTPATIENT
Start: 2021-08-04 | End: 2021-08-05 | Stop reason: HOSPADM

## 2021-07-30 RX ORDER — ACETAMINOPHEN 325 MG/1
650 TABLET ORAL ONCE
Status: ACTIVE | OUTPATIENT
Start: 2021-08-04 | End: 2021-08-04

## 2021-08-02 ENCOUNTER — APPOINTMENT (OUTPATIENT)
Dept: GENERAL RADIOLOGY | Age: 51
End: 2021-08-02
Attending: EMERGENCY MEDICINE
Payer: COMMERCIAL

## 2021-08-02 ENCOUNTER — APPOINTMENT (OUTPATIENT)
Dept: CT IMAGING | Age: 51
End: 2021-08-02
Attending: EMERGENCY MEDICINE
Payer: COMMERCIAL

## 2021-08-02 ENCOUNTER — HOSPITAL ENCOUNTER (EMERGENCY)
Age: 51
Discharge: HOME | End: 2021-08-02
Attending: EMERGENCY MEDICINE
Payer: COMMERCIAL

## 2021-08-02 VITALS
RESPIRATION RATE: 15 BRPM | DIASTOLIC BLOOD PRESSURE: 74 MMHG | HEART RATE: 98 BPM | OXYGEN SATURATION: 97 % | TEMPERATURE: 97.6 F | SYSTOLIC BLOOD PRESSURE: 145 MMHG

## 2021-08-02 DIAGNOSIS — R06.00 DYSPNEA, UNSPECIFIED TYPE: Primary | ICD-10-CM

## 2021-08-02 LAB
ALBUMIN SERPL-MCNC: 3.4 G/DL (ref 3.5–5)
ALBUMIN/GLOB SERPL: 0.9 {RATIO} (ref 1.1–2.2)
ALP SERPL-CCNC: 79 U/L (ref 45–117)
ALT SERPL-CCNC: 26 U/L (ref 12–78)
ANION GAP SERPL CALC-SCNC: 7 MMOL/L (ref 5–15)
AST SERPL-CCNC: 13 U/L (ref 15–37)
ATRIAL RATE: 82 BPM
BASOPHILS # BLD: 0 K/UL (ref 0–0.1)
BASOPHILS NFR BLD: 0 % (ref 0–1)
BILIRUB SERPL-MCNC: 0.3 MG/DL (ref 0.2–1)
BUN SERPL-MCNC: 11 MG/DL (ref 6–20)
BUN/CREAT SERPL: 13 (ref 12–20)
CALCIUM SERPL-MCNC: 8.7 MG/DL (ref 8.5–10.1)
CALCULATED P AXIS, ECG09: 67 DEGREES
CALCULATED R AXIS, ECG10: 29 DEGREES
CALCULATED T AXIS, ECG11: 33 DEGREES
CHLORIDE SERPL-SCNC: 110 MMOL/L (ref 97–108)
CO2 SERPL-SCNC: 23 MMOL/L (ref 21–32)
COMMENT, HOLDF: NORMAL
CREAT SERPL-MCNC: 0.83 MG/DL (ref 0.55–1.02)
D DIMER PPP FEU-MCNC: 0.77 MG/L FEU (ref 0–0.65)
DIAGNOSIS, 93000: NORMAL
DIFFERENTIAL METHOD BLD: ABNORMAL
EOSINOPHIL # BLD: 0.5 K/UL (ref 0–0.4)
EOSINOPHIL NFR BLD: 5 % (ref 0–7)
ERYTHROCYTE [DISTWIDTH] IN BLOOD BY AUTOMATED COUNT: 13.4 % (ref 11.5–14.5)
GLOBULIN SER CALC-MCNC: 4 G/DL (ref 2–4)
GLUCOSE SERPL-MCNC: 102 MG/DL (ref 65–100)
HCT VFR BLD AUTO: 34 % (ref 35–47)
HGB BLD-MCNC: 10.9 G/DL (ref 11.5–16)
IMM GRANULOCYTES # BLD AUTO: 0 K/UL (ref 0–0.04)
IMM GRANULOCYTES NFR BLD AUTO: 0 % (ref 0–0.5)
LYMPHOCYTES # BLD: 3.3 K/UL (ref 0.8–3.5)
LYMPHOCYTES NFR BLD: 33 % (ref 12–49)
MCH RBC QN AUTO: 26.1 PG (ref 26–34)
MCHC RBC AUTO-ENTMCNC: 32.1 G/DL (ref 30–36.5)
MCV RBC AUTO: 81.3 FL (ref 80–99)
MONOCYTES # BLD: 0.7 K/UL (ref 0–1)
MONOCYTES NFR BLD: 7 % (ref 5–13)
NEUTS SEG # BLD: 5.4 K/UL (ref 1.8–8)
NEUTS SEG NFR BLD: 55 % (ref 32–75)
NRBC # BLD: 0 K/UL (ref 0–0.01)
NRBC BLD-RTO: 0 PER 100 WBC
P-R INTERVAL, ECG05: 134 MS
PLATELET # BLD AUTO: 339 K/UL (ref 150–400)
PMV BLD AUTO: 11 FL (ref 8.9–12.9)
POTASSIUM SERPL-SCNC: 3.3 MMOL/L (ref 3.5–5.1)
PROT SERPL-MCNC: 7.4 G/DL (ref 6.4–8.2)
Q-T INTERVAL, ECG07: 392 MS
QRS DURATION, ECG06: 94 MS
QTC CALCULATION (BEZET), ECG08: 457 MS
RBC # BLD AUTO: 4.18 M/UL (ref 3.8–5.2)
SAMPLES BEING HELD,HOLD: NORMAL
SARS-COV-2, COV2: NORMAL
SARS-COV-2, XPLCVT: NOT DETECTED
SODIUM SERPL-SCNC: 140 MMOL/L (ref 136–145)
SOURCE, COVRS: NORMAL
TROPONIN I SERPL-MCNC: <0.05 NG/ML
VENTRICULAR RATE, ECG03: 82 BPM
WBC # BLD AUTO: 9.9 K/UL (ref 3.6–11)

## 2021-08-02 PROCEDURE — 85379 FIBRIN DEGRADATION QUANT: CPT

## 2021-08-02 PROCEDURE — 84484 ASSAY OF TROPONIN QUANT: CPT

## 2021-08-02 PROCEDURE — 99284 EMERGENCY DEPT VISIT MOD MDM: CPT

## 2021-08-02 PROCEDURE — 96375 TX/PRO/DX INJ NEW DRUG ADDON: CPT

## 2021-08-02 PROCEDURE — 85025 COMPLETE CBC W/AUTO DIFF WBC: CPT

## 2021-08-02 PROCEDURE — 77030029684 HC NEB SM VOL KT MONA -A

## 2021-08-02 PROCEDURE — 80053 COMPREHEN METABOLIC PANEL: CPT

## 2021-08-02 PROCEDURE — 74011000250 HC RX REV CODE- 250: Performed by: EMERGENCY MEDICINE

## 2021-08-02 PROCEDURE — 71045 X-RAY EXAM CHEST 1 VIEW: CPT

## 2021-08-02 PROCEDURE — 74011000636 HC RX REV CODE- 636: Performed by: RADIOLOGY

## 2021-08-02 PROCEDURE — 94640 AIRWAY INHALATION TREATMENT: CPT

## 2021-08-02 PROCEDURE — 96374 THER/PROPH/DIAG INJ IV PUSH: CPT

## 2021-08-02 PROCEDURE — U0005 INFEC AGEN DETEC AMPLI PROBE: HCPCS

## 2021-08-02 PROCEDURE — 74011250636 HC RX REV CODE- 250/636: Performed by: EMERGENCY MEDICINE

## 2021-08-02 PROCEDURE — 36415 COLL VENOUS BLD VENIPUNCTURE: CPT

## 2021-08-02 PROCEDURE — 71275 CT ANGIOGRAPHY CHEST: CPT

## 2021-08-02 PROCEDURE — 93005 ELECTROCARDIOGRAM TRACING: CPT

## 2021-08-02 RX ORDER — ALBUTEROL SULFATE 90 UG/1
2 AEROSOL, METERED RESPIRATORY (INHALATION)
Qty: 1 INHALER | Refills: 0 | Status: SHIPPED | OUTPATIENT
Start: 2021-08-02

## 2021-08-02 RX ORDER — HEPARIN 100 UNIT/ML
300 SYRINGE INTRAVENOUS
Status: COMPLETED | OUTPATIENT
Start: 2021-08-02 | End: 2021-08-02

## 2021-08-02 RX ORDER — PREDNISONE 20 MG/1
40 TABLET ORAL DAILY
Qty: 8 TABLET | Refills: 0 | Status: SHIPPED | OUTPATIENT
Start: 2021-08-02 | End: 2021-08-06

## 2021-08-02 RX ORDER — HEPARIN 100 UNIT/ML
SYRINGE INTRAVENOUS
Status: DISCONTINUED
Start: 2021-08-02 | End: 2021-08-02 | Stop reason: HOSPADM

## 2021-08-02 RX ORDER — LORAZEPAM 2 MG/ML
1 INJECTION INTRAMUSCULAR ONCE
Status: COMPLETED | OUTPATIENT
Start: 2021-08-02 | End: 2021-08-02

## 2021-08-02 RX ORDER — LORAZEPAM 2 MG/ML
0.5 INJECTION INTRAMUSCULAR ONCE
Status: DISCONTINUED | OUTPATIENT
Start: 2021-08-02 | End: 2021-08-02

## 2021-08-02 RX ADMIN — ALBUTEROL SULFATE 1 DOSE: 2.5 SOLUTION RESPIRATORY (INHALATION) at 05:50

## 2021-08-02 RX ADMIN — Medication 300 UNITS: at 08:09

## 2021-08-02 RX ADMIN — ALBUTEROL SULFATE 1 DOSE: 2.5 SOLUTION RESPIRATORY (INHALATION) at 06:09

## 2021-08-02 RX ADMIN — IOPAMIDOL 80 ML: 755 INJECTION, SOLUTION INTRAVENOUS at 07:16

## 2021-08-02 RX ADMIN — LORAZEPAM 1 MG: 2 INJECTION INTRAMUSCULAR; INTRAVENOUS at 06:45

## 2021-08-02 RX ADMIN — METHYLPREDNISOLONE SODIUM SUCCINATE 125 MG: 125 INJECTION, POWDER, FOR SOLUTION INTRAMUSCULAR; INTRAVENOUS at 05:51

## 2021-08-02 NOTE — ED TRIAGE NOTES
Patient arrives into the ED with SOB. She states that she was woken up by the respiratory distress. She states that she has a history of asthma. She took her husbands inhaler without relief.  She denies any pain or swelling in her legs and denies any chest pain

## 2021-08-02 NOTE — ED PROVIDER NOTES
HPI     29-year-old female with a history of Lyme's disease, ulcerative colitis on Remicade, acid reflux, asthma, presents the emergency department with shortness of breath. Patient states she has had a sinus type infection for the past few days, but no fever. She is a nurse in cardiac rehab, she has been vaccinated against COVID-19, and she has not been around any one she knows of with Covid. She denies any chest pain, she states that she was woken up by her  who was getting up and she noticed when she went to the bathroom that she was short of breath. She has not been able to catch her breath ever since. She feels like she cannot get a good deep breath. She does not smoke, is not on any hormone therapy, has not had any travel, surgery, or history of DVT PE. She did try her albuterol inhaler without relief. She feels like the congestion/cold is now in her chest.    Past Medical History:   Diagnosis Date    Asthma     GERD (gastroesophageal reflux disease)     Ulcerative colitis     Ulcerative colitis ([facility] [place] [address]        Past Surgical History:   Procedure Laterality Date    HX LIPOSUCTION      HX VASCULAR ACCESS      removed in 2008         History reviewed. No pertinent family history.     Social History     Socioeconomic History    Marital status: UNKNOWN     Spouse name: Not on file    Number of children: Not on file    Years of education: Not on file    Highest education level: Not on file   Occupational History    Not on file   Tobacco Use    Smoking status: Never Smoker    Smokeless tobacco: Never Used   Substance and Sexual Activity    Alcohol use: Yes     Comment: social    Drug use: No    Sexual activity: Not on file   Other Topics Concern    Not on file   Social History Narrative    ** Merged History Encounter **          Social Determinants of Health     Financial Resource Strain:     Difficulty of Paying Living Expenses:    Food Insecurity:     Worried About Running Out of Food in the Last Year:    [address] in the Last Year:    Transportation Needs:     Lack of Transportation (Medical):  Lack of Transportation (Non-Medical):    Physical Activity:     Days of Exercise per Week:     Minutes of Exercise per Session:    Stress:     Feeling of Stress :    Social Connections:     Frequency of Communication with Friends and Family:     Frequency of Social Gatherings with Friends and Family:     Attends Episcopal Services:     Active Member of Clubs or Organizations:     Attends Club or Organization Meetings:     Marital Status:    Intimate Partner Violence:     Fear of Current or Ex-Partner:     Emotionally Abused:     Physically Abused:     Sexually Abused: ALLERGIES: Banana, Ibuprofen, Nsaids (non-steroidal anti-inflammatory drug), Sulfa (sulfonamide antibiotics), and Sulfa (sulfonamide antibiotics)    Review of Systems   Constitutional: Negative for fever. HENT: Negative for congestion. Eyes: Negative for visual disturbance. Respiratory: Positive for shortness of breath. Negative for cough and chest tightness. Cardiovascular: Negative for chest pain, palpitations and leg swelling. Gastrointestinal: Negative for abdominal pain, nausea and vomiting. Endocrine: Negative for polyuria. Genitourinary: Negative for dysuria. Musculoskeletal: Negative for gait problem. Neurological: Negative for headaches. Psychiatric/Behavioral: Negative for dysphoric mood. Vitals:    08/02/21 0520   BP: (!) 141/83   Pulse: 98   Resp: 24   Temp: 97.6 °F (36.4 °C)   SpO2: 100%            Physical Exam  Constitutional:       General: She is not in acute distress. Appearance: She is well-developed. HENT:      Head: Normocephalic and atraumatic. Mouth/Throat:      Pharynx: No oropharyngeal exudate. Eyes:      General: No scleral icterus. Right eye: No discharge. Left eye: No discharge.       Pupils: Pupils are equal, round, and reactive to light. Neck:      Vascular: No JVD. Cardiovascular:      Rate and Rhythm: Normal rate and regular rhythm. Heart sounds: Normal heart sounds. No murmur heard. Pulmonary:      Effort: Pulmonary effort is normal. Tachypnea present. No respiratory distress. Breath sounds: Normal breath sounds. Decreased air movement present. No stridor. No wheezing or rales. Chest:      Chest wall: No tenderness. Abdominal:      General: Bowel sounds are normal. There is no distension. Palpations: Abdomen is soft. There is no mass. Tenderness: There is no abdominal tenderness. There is no guarding or rebound. Musculoskeletal:         General: Normal range of motion. Cervical back: Normal range of motion and neck supple. Skin:     General: Skin is warm and dry. Capillary Refill: Capillary refill takes less than 2 seconds. Findings: No rash. Neurological:      Mental Status: She is oriented to person, place, and time. Psychiatric:         Behavior: Behavior normal.         Thought Content: Thought content normal.         Judgment: Judgment normal.          MDM       Procedures      ED EKG interpretation:  Rhythm: normal sinus rhythm; and regular . Rate (approx.): 82; Axis: normal; P wave: normal; QRS interval: normal ; ST/T wave: non-specific changes; . This EKG was interpreted by Julianne Hicks MD,ED Provider. Cup thus far is reassuring including blood, negative troponin, chest x-ray is clear. She does not feel much relief from the albuterol however she does have better air movement to me. Her sats are 100% she is talking in full sentences since she arrived. Her D-dimer is a little bit elevated so I am ordering a CT scan. Patient became very anxious after I told her we needed to rule out a PE. She is crying,  at the bedside, she is very worried and is requesting something to help with her anxiety. Ativan has been ordered.      care signed over to Renee RAMIREZ [facility] at change of shift. CT pending        Update 8 AM  This is Dr. Steven Jade. CTA came back negative for any acute process. I went and spoke with the patient and her significant other. She is feeling much better. She has had some viral symptoms recently, so she was interested in a Covid test.  Plan to send her home with albuterol inhaler and a short course of prednisone. Return if breathing gets worse or any other concerning symptoms. Otherwise, follow-up with primary care physician.   ELI

## 2021-08-03 ENCOUNTER — PATIENT OUTREACH (OUTPATIENT)
Dept: OTHER | Age: 51
End: 2021-08-03

## 2021-08-03 ENCOUNTER — NURSE TRIAGE (OUTPATIENT)
Dept: OTHER | Facility: CLINIC | Age: 51
End: 2021-08-03

## 2021-08-03 NOTE — TELEPHONE ENCOUNTER
Reason for Disposition   Health Information question, no triage required and triager able to answer question    Answer Assessment - Initial Assessment Questions  1. REASON FOR CALL or QUESTION: \"What is your reason for calling today? \" or \"How can I best help you? \" or \"What question do you have that I can help answer? \"      Patient reports she woke up with respiratory distress, tachypnic, and her oxygen saturation was 88%. Patient had a chest cold days prior and reports a hx of asthma. Patient has already been seen in the ER prior to call and reports symptoms have improved so RN did not re-triage. Protocols used: INFORMATION ONLY CALL - NO TRIAGE-ADULT-    Brief description of triage: See above note        Care advice provided, patient verbalizes understanding; denies any other questions or concerns; instructed to call back for any new or worsening symptoms. Attention Provider: Thank you for allowing me to participate in the care of your patient. The patient was connected to triage in response to symptoms provided. Please do not respond through this encounter as the response is not directed to a shared pool.

## 2021-08-03 NOTE — PROGRESS NOTES
[facility] progress note    Patient eligible for [address] [address] care management  Discussed the care management program.  Patient agrees to care management services at this time. Pt was seen at [facility] ER 8/3/21. Diagnosis: Dyspnea. Pt reported feeling better today, but continues to have a dry cough. Denies temp. [address] employee      PMH:   Past Medical History:   Diagnosis Date    Asthma     GERD (gastroesophageal reflux disease)     Ulcerative colitis     Ulcerative colitis ([address]        Social History:   Social History     Socioeconomic History    Marital status: UNKNOWN     Spouse name: Not on file    Number of children: Not on file    Years of education: Not on file    Highest education level: Not on file   Occupational History    Not on file   Tobacco Use    Smoking status: Never Smoker    Smokeless tobacco: Never Used   Substance and Sexual Activity    Alcohol use: Yes     Comment: social    Drug use: No    Sexual activity: Not on file   Other Topics Concern    Not on file   Social History Narrative    ** Merged History Encounter **          Social Determinants of Health     Financial Resource Strain:     Difficulty of Paying Living Expenses:    Food Insecurity:     Worried About Running Out of Food in the Last Year:     Ran Out of Food in the Last Year:    Transportation Needs:     Lack of Transportation (Medical):      Lack of Transportation (Non-Medical):    Physical Activity:     Days of Exercise per Week:     Minutes of Exercise per Session:    Stress:     Feeling of Stress :    Social Connections:     Frequency of Communication with Friends and Family:     Frequency of Social Gatherings with Friends and Family:     Attends Taoism Services:     Active Member of Clubs or Organizations:     Attends Club or Organization Meetings:     Marital Status:    Intimate Partner Violence:     Fear of Current or Ex-Partner:     Emotionally Abused:     Physically Abused:     Sexually Abused:        Patient's primary care provider relationship reviewed with patient and modified, as applicable. Care management assessment completed:    Condition Focused Assessment: Respiratory Condition Focused Assessment  Supplemental oxygen use? no   Clean and working equipment? n/a   Oxygen settings- n/a  Cough yes    Patient reported important numbers to know:  Oxygen level n/a   Temperature afebrile   Description of any sputum none   Pain no   Weight 205     Any change in activity level?  no  Any of the following? Shortness of breath or difficulty breathing yes   Dizziness/lightheadedness no   Fever no   Low body temperature no   Chills or shaking no   Sweating no    Dyspnea on exertion yes     Fatigue no     Anxiety no    Confusionno   Unexplained change in weight loss n/a   Sleep disturbance n/a     Incentive Spirometer? n/a   Does patient have action plan? yes         Medications:  New Medications at Discharge:   albuterol (PROVENTIL HFA, VENTOLIN HFA, PROAIR  HFA)  predniSONE (DELTASONE)    Changed Medications at Discharge: no  Discontinued Medications at Discharge: no    Current Outpatient Medications   Medication Sig    albuterol (PROVENTIL HFA, VENTOLIN HFA, PROAIR HFA) 90 mcg/actuation inhaler Take 2 Puffs by inhalation every four (4) hours as needed for Wheezing.  multivit with iron,minerals (MULTIVITAMIN AND MINERALS PO) multivitamin    pantoprazole (PROTONIX) 40 mg tablet Take 40 mg by mouth daily.  INFLIXIMAB (REMICADE IV) 412 mg by IntraVENous route every two (2) months.  amoxicillin-clavulanate (AUGMENTIN) 875-125 mg per tablet Take 1 Tablet by mouth two (2) times a day. No current facility-administered medications for this visit. There are no discontinued medications. Performed medication reconciliation with patient, and patient verbalizes understanding of administration of home medications.   There were no barriers to obtaining medications identified at this time.    Preventive Care     Health Maintenance   Topic Date Due    Hepatitis C Screening  Never done    COVID-19 Vaccine (1) Never done    DTaP/Tdap/Td series (1 - Tdap) Never done    PAP AKA CERVICAL CYTOLOGY  Never done    Lipid Screen  Never done    Colorectal Cancer Screening Combo  Never done    Flu Vaccine (1) 09/01/2021    Pneumococcal 0-64 years  Aged Out       Healthy Habits    Nutrition: well balance meal    Movement: every 1-2 hours    Goals   Goals      Attends follow-up appointments as directed. PCP - TBD       Knowledge and adherence of prescribed medication (ie. action, side effects, missed dose, etc.). Taking prescribed meds       Supportive resources in place to maintain patient in the community (ie. Home Health, DME equipment, refer to, medication assistant plan, etc.)      [facility] - # [phone] 24/7  Nurse Access line 24/7  Be Well  [address] [facility] [address] Urgent [facility] [phone]  HR Service Now - [facility] Workday  IT - [phone]  [facility] Help - 1- [phone]  Associate Services for advice and direction, by calling [phone] and pressing 1 or Absence. [email or web address]  [employer] Matters - [phone] Go to [employer] on the Internet or your mobile device and enter the password [email or web address] to access resources, educational information, and self-service options.  Understands red flags post discharge related to Dyspnea      Your shortness of breath gets worse or you start to wheeze. Wheezing is a high-pitched  sound when you breathe. You wake up at night out of breath or have to prop your head up on several pillows to  breathe.   You are short of breath after only light activity or while at rest.              Barriers/Support system:  patient and spouse      Barriers/Challenges to Care: []  Decline in memory    []  Language barrier     []  Emotional                  []  Limited mobility  []  Lack of motivation     [] Vision, hearing or cognitive impairment []  Knowledge [] Financial Barriers []  Lack of support  []  Pain []  Other     PCP/Specialist follow up:   Future Appointments   Date Time Provider Bhumi Wolf   8/16/2021  1:00 PM B3 [place] [id number] [facility]   10/12/2021  1:00 PM B1 [address]   12/7/2021  1:00 PM B1 [address]      Reviewed red flags with patient, and patient verbalizes understanding. Patient given an opportunity to ask questions. No other clinical/social/functional needs noted. The patient agrees to contact the PCP office for questions related to their healthcare. The patient expressed thanks, offered no additional questions and ended the call.       Plan for next call: 1 week

## 2021-08-04 ENCOUNTER — HOSPITAL ENCOUNTER (OUTPATIENT)
Dept: INFUSION THERAPY | Age: 51
Discharge: HOME | End: 2021-08-04

## 2021-08-05 ENCOUNTER — OFFICE VISIT (OUTPATIENT)
Dept: PRIMARY CARE CLINIC | Age: 51
End: 2021-08-05
Payer: COMMERCIAL

## 2021-08-05 VITALS
DIASTOLIC BLOOD PRESSURE: 76 MMHG | HEART RATE: 76 BPM | SYSTOLIC BLOOD PRESSURE: 128 MMHG | OXYGEN SATURATION: 97 % | RESPIRATION RATE: 16 BRPM | HEIGHT: 66 IN | BODY MASS INDEX: 32.95 KG/M2 | TEMPERATURE: 98.2 F | WEIGHT: 205 LBS

## 2021-08-05 DIAGNOSIS — J06.9 URTI (ACUTE UPPER RESPIRATORY INFECTION): Primary | ICD-10-CM

## 2021-08-05 PROCEDURE — 99213 OFFICE O/P EST LOW 20 MIN: CPT | Performed by: INTERNAL MEDICINE

## 2021-08-05 RX ORDER — AMOXICILLIN AND CLAVULANATE POTASSIUM 875; 125 MG/1; MG/1
1 TABLET, FILM COATED ORAL 2 TIMES DAILY
Qty: 14 TABLET | Refills: 0 | Status: SHIPPED | OUTPATIENT
Start: 2021-08-05 | End: 2022-10-24

## 2021-08-05 NOTE — PROGRESS NOTES
RM 5    Chief Complaint   Patient presents with    Cough     x 2 weeks cough, ear pain , sinus pressure       Visit Vitals  /76 (BP 1 Location: Right arm, BP Patient Position: Sitting)   Pulse 76   Temp 98.2 °F (36.8 °C) (Oral)   Resp 16   Ht 5' 6\" (1.676 m)   Wt 205 lb (93 kg)   SpO2 97%   BMI 33.09 kg/m²

## 2021-08-05 NOTE — PROGRESS NOTES
HISTORY OF PRESENT ILLNESS  Ravin Howell is a 53 y.o. female. Patient reports that she continues to have some chest congestion, cough and SOB at times. Her symptoms began 2 weeks ago tomorrow. Has been seen in the ER in the last couple of days for SOB. She was unable to catch her breath. D dimer was elevated and a CTA was performed. It was negative. was given solumedrol and sent home on steroids. Reports that she is having some sinus pressure and ear pressure. Reports no fever, or myalgia. Has received both COVID vaccines. Visit Vitals  /76 (BP 1 Location: Right arm, BP Patient Position: Sitting)   Pulse 76   Temp 98.2 °F (36.8 °C) (Oral)   Resp 16   Ht 5' 6\" (1.676 m)   Wt 205 lb (93 kg)   LMP 2021   SpO2 97%   BMI 33.09 kg/m²     Past Medical History:   Diagnosis Date    Asthma     GERD (gastroesophageal reflux disease)     Ulcerative colitis     Ulcerative colitis ([facility] [address]      Past Surgical History:   Procedure Laterality Date    HX LIPOSUCTION      HX VASCULAR ACCESS      removed in    No family history on file. Outpatient Encounter Medications as of 2021   Medication Sig Dispense Refill    amoxicillin-clavulanate (AUGMENTIN) 875-125 mg per tablet Take 1 Tablet by mouth two (2) times a day. 14 Tablet 0    albuterol (PROVENTIL HFA, VENTOLIN HFA, PROAIR HFA) 90 mcg/actuation inhaler Take 2 Puffs by inhalation every four (4) hours as needed for Wheezing. 1 Inhaler 0    predniSONE (DELTASONE) 20 mg tablet Take 40 mg by mouth daily for 4 days. With Breakfast 8 Tablet 0    multivit with iron,minerals (MULTIVITAMIN AND MINERALS PO) multivitamin      pantoprazole (PROTONIX) 40 mg tablet Take 40 mg by mouth daily.  INFLIXIMAB (REMICADE IV) 412 mg by IntraVENous route every two (2) months.        Facility-Administered Encounter Medications as of 2021   Medication Dose Route Frequency Provider Last Rate Last Admin    [] 0.9% sodium chloride infusion  25 mL/hr IntraVENous CONTINUOUS Alessandro Donato MD        [] acetaminophen (TYLENOL) tablet 650 mg  650 mg Oral Q4H PRN Alessandro Donato MD        [] diphenhydrAMINE (BENADRYL) injection 50 mg  50 mg IntraVENous Q4H PRN Alessandro Donato MD         HPI    Review of Systems   Constitutional: Negative. HENT: Positive for congestion. Negative for ear discharge, hearing loss and sore throat. Ear pressure    Respiratory: Positive for cough and sputum production. Negative for shortness of breath and wheezing. Cardiovascular: Negative for chest pain. Gastrointestinal: Negative. Musculoskeletal: Negative. Neurological: Negative. Physical Exam  Vitals and nursing note reviewed. Constitutional:       General: She is not in acute distress. Appearance: She is not ill-appearing. HENT:      Right Ear: Tympanic membrane is bulging. Left Ear: Tympanic membrane is bulging. Nose: Nose normal.      Mouth/Throat:      Pharynx: No oropharyngeal exudate or posterior oropharyngeal erythema. Eyes:      Pupils: Pupils are equal, round, and reactive to light. Cardiovascular:      Rate and Rhythm: Normal rate and regular rhythm. Pulmonary:      Effort: Pulmonary effort is normal.      Breath sounds: Normal breath sounds. Comments: Upon cough on ausculation sounds congested   Abdominal:      Palpations: Abdomen is soft. Skin:     General: Skin is warm. Neurological:      Mental Status: She is alert and oriented to person, place, and time. ASSESSMENT and PLAN  Diagnoses and all orders for this visit:    1. URTI (acute upper respiratory infection)  -     amoxicillin-clavulanate (AUGMENTIN) 875-125 mg per tablet; Take 1 Tablet by mouth two (2) times a day.         -     mucinex every 12 hours         -     Continue inhaler PRN        -     Will complete PO steroids at 40 mg       Follow-up and Dispositions    · Return for Follow up.         lab results and schedule of future lab studies reviewed with patient  reviewed diet, exercise and weight control  reviewed medications and side effects in detail

## 2021-08-11 ENCOUNTER — PATIENT OUTREACH (OUTPATIENT)
Dept: OTHER | Age: 51
End: 2021-08-11

## 2021-08-11 NOTE — PROGRESS NOTES
[facility] Outreach    Goals      Attends follow-up appointments as directed. PCP - TBD    8/11/21 Call placed to patient, no answer. Voicemail left to return call.  Knowledge and adherence of prescribed medication (ie. action, side effects, missed dose, etc.). Taking prescribed meds       Supportive resources in place to maintain patient in the community (ie. Home Health, DME equipment, refer to, medication assistant plan, etc.)      [facility] - # [phone] 24/7  Nurse Access line 24/7  Be Well  [address] [facility] 97 Urgent [facility] [phone]  HR Service Now - [place]  [facility] Workday  IT - [phone]  [employer] Help - 1- [phone]  Associate Services for advice and direction, by calling [phone] and pressing 1 or Absence. [email or web address]  Life Matters - [phone] Go to [employer] on the Internet or your mobile device and enter the password [email or web address] to access resources, educational information, and self-service options.  Understands red flags post discharge related to Dyspnea      Your shortness of breath gets worse or you start to wheeze. Wheezing is a high-pitched  sound when you breathe. You wake up at night out of breath or have to prop your head up on several pillows to  breathe.   You are short of breath after only light activity or while at rest.          CM will f/u in 1 week

## 2021-08-16 ENCOUNTER — HOSPITAL ENCOUNTER (OUTPATIENT)
Dept: INFUSION THERAPY | Age: 51
Discharge: HOME | End: 2021-08-16
Payer: COMMERCIAL

## 2021-08-16 VITALS
SYSTOLIC BLOOD PRESSURE: 138 MMHG | RESPIRATION RATE: 16 BRPM | DIASTOLIC BLOOD PRESSURE: 90 MMHG | TEMPERATURE: 97.5 F | HEART RATE: 84 BPM

## 2021-08-16 PROCEDURE — 96365 THER/PROPH/DIAG IV INF INIT: CPT

## 2021-08-16 PROCEDURE — 77030012965 HC NDL HUBR BBMI -A

## 2021-08-16 PROCEDURE — 74011250636 HC RX REV CODE- 250/636: Performed by: INTERNAL MEDICINE

## 2021-08-16 PROCEDURE — 96366 THER/PROPH/DIAG IV INF ADDON: CPT

## 2021-08-16 RX ORDER — DIPHENHYDRAMINE HCL 25 MG
50 CAPSULE ORAL ONCE
Status: DISCONTINUED | OUTPATIENT
Start: 2021-08-16 | End: 2021-08-17 | Stop reason: HOSPADM

## 2021-08-16 RX ORDER — DIPHENHYDRAMINE HYDROCHLORIDE 50 MG/ML
50 INJECTION, SOLUTION INTRAMUSCULAR; INTRAVENOUS
Status: DISCONTINUED | OUTPATIENT
Start: 2021-08-16 | End: 2021-08-17 | Stop reason: HOSPADM

## 2021-08-16 RX ORDER — SODIUM CHLORIDE 9 MG/ML
25 INJECTION, SOLUTION INTRAVENOUS CONTINUOUS
Status: DISCONTINUED | OUTPATIENT
Start: 2021-08-16 | End: 2021-08-17 | Stop reason: HOSPADM

## 2021-08-16 RX ORDER — ACETAMINOPHEN 325 MG/1
650 TABLET ORAL ONCE
Status: DISCONTINUED | OUTPATIENT
Start: 2021-08-16 | End: 2021-08-17 | Stop reason: HOSPADM

## 2021-08-16 RX ORDER — ACETAMINOPHEN 325 MG/1
650 TABLET ORAL
Status: DISCONTINUED | OUTPATIENT
Start: 2021-08-16 | End: 2021-08-17 | Stop reason: HOSPADM

## 2021-08-16 RX ORDER — DIPHENHYDRAMINE HYDROCHLORIDE 50 MG/ML
50 INJECTION, SOLUTION INTRAMUSCULAR; INTRAVENOUS ONCE
Status: DISCONTINUED | OUTPATIENT
Start: 2021-08-16 | End: 2021-08-17 | Stop reason: HOSPADM

## 2021-08-16 RX ADMIN — SODIUM CHLORIDE 25 ML/HR: 900 INJECTION, SOLUTION INTRAVENOUS at 16:02

## 2021-08-16 RX ADMIN — INFLIXIMAB 100 MG: 100 INJECTION, POWDER, LYOPHILIZED, FOR SOLUTION INTRAVENOUS at 16:03

## 2021-08-16 NOTE — PROGRESS NOTES
[facility] Progress Note    Date: 2021    Name: Katrin Verma    MRN: [id number]         : 1970        1500: Pt arrived ambulatory to [facility] for Remicade in stable condition. Assessment completed. No other complaints voiced at this time. Port with positive blood return. Normal Saline started at [facility]. Patient denies SOB, fever, cough, general not feeling well. Patient denies recent exposure to someone who has tested positive for COVID-19. Patient denies having contact with anyone who has a pending COVID test.      Patient Vitals for the past 12 hrs:   Temp Pulse Resp BP   21 1500 97.5 °F (36.4 °C) 84 16 (!) 138/90         Medications Administered     0.9% sodium chloride infusion     Admin Date  2021 Action  New Bag Dose  25 mL/hr Rate  25 mL/hr Route  IntraVENous Administered By  Marti Mora RN          inFLIXimab (REMICADE) 100 mg in 0.9% sodium chloride 250 mL, overfill volume 25 mL infusion     Admin Date  2021 Action  New Bag Dose  100 mg Route  IntraVENous Administered By  Marti Mora RN                  9251: Tolerated treatment well, no adverse reactions noted. D/Cd from [facility] ambulatory and in no distress.       Future Appointments   Date Time Provider Bhumi Wolf   10/12/2021  1:00 PM B1 [facility] [address] [facility]   2021  1:00 PM B1 [facility] [address], RN  2021

## 2021-08-31 ENCOUNTER — PATIENT OUTREACH (OUTPATIENT)
Dept: OTHER | Age: 51
End: 2021-08-31

## 2021-09-13 ENCOUNTER — APPOINTMENT (OUTPATIENT)
Dept: INFUSION THERAPY | Age: 51
End: 2021-09-13
Payer: COMMERCIAL

## 2021-10-07 ENCOUNTER — PATIENT OUTREACH (OUTPATIENT)
Dept: OTHER | Age: 51
End: 2021-10-07

## 2021-10-07 NOTE — PROGRESS NOTES
Resolving current episode of case management. Patient has met patient stated and/or medical goals. Patient consistenly demonstrates understanding of the medical action plan through execution of plan. Appointments with key providers are scheduled and attended. Plan of care is modified and updated to address new challenges and barriers with minimal support from the CM team(proactively uses physicians and community resources) The support system remains current and has been modified as needed. Patient continues to acquire needed resources from the current support system established. Teach back demonstrates that patient understands education for self management of chronic conditions. Patient consistenly communicates understanding of signs,symptoms and complications for all major diagnoses. Patient modifies his/her lifestyle toreduce or avoid risk factors to his/her health. ECM will follow as needed and patient has ECM contact information for future needs. Pt expressed that she no longer requires ACM services and is feeling 100% better.

## 2021-10-12 ENCOUNTER — APPOINTMENT (OUTPATIENT)
Dept: INFUSION THERAPY | Age: 51
End: 2021-10-12

## 2021-10-13 RX ORDER — DIPHENHYDRAMINE HYDROCHLORIDE 50 MG/ML
50 INJECTION, SOLUTION INTRAMUSCULAR; INTRAVENOUS
Status: DISCONTINUED | OUTPATIENT
Start: 2021-10-20 | End: 2021-10-19

## 2021-10-13 RX ORDER — ACETAMINOPHEN 325 MG/1
650 TABLET ORAL ONCE
Status: DISCONTINUED | OUTPATIENT
Start: 2021-10-20 | End: 2021-10-19

## 2021-10-13 RX ORDER — ACETAMINOPHEN 325 MG/1
650 TABLET ORAL
Status: DISCONTINUED | OUTPATIENT
Start: 2021-10-20 | End: 2021-10-19

## 2021-10-13 RX ORDER — SODIUM CHLORIDE 9 MG/ML
25 INJECTION, SOLUTION INTRAVENOUS CONTINUOUS
Status: DISCONTINUED | OUTPATIENT
Start: 2021-10-20 | End: 2021-10-19

## 2021-10-19 RX ORDER — ACETAMINOPHEN 325 MG/1
650 TABLET ORAL ONCE
Status: ACTIVE | OUTPATIENT
Start: 2021-10-26 | End: 2021-10-26

## 2021-10-19 RX ORDER — ACETAMINOPHEN 325 MG/1
650 TABLET ORAL
Status: ACTIVE | OUTPATIENT
Start: 2021-10-26 | End: 2021-10-26

## 2021-10-19 RX ORDER — SODIUM CHLORIDE 9 MG/ML
25 INJECTION, SOLUTION INTRAVENOUS CONTINUOUS
Status: DISPENSED | OUTPATIENT
Start: 2021-10-26 | End: 2021-10-26

## 2021-10-19 RX ORDER — DIPHENHYDRAMINE HYDROCHLORIDE 50 MG/ML
50 INJECTION, SOLUTION INTRAMUSCULAR; INTRAVENOUS
Status: ACTIVE | OUTPATIENT
Start: 2021-10-26 | End: 2021-10-26

## 2021-10-20 ENCOUNTER — HOSPITAL ENCOUNTER (OUTPATIENT)
Dept: INFUSION THERAPY | Age: 51
Discharge: HOME | End: 2021-10-20

## 2021-10-26 ENCOUNTER — HOSPITAL ENCOUNTER (OUTPATIENT)
Dept: INFUSION THERAPY | Age: 51
Discharge: HOME | End: 2021-10-26
Payer: COMMERCIAL

## 2021-10-26 VITALS
DIASTOLIC BLOOD PRESSURE: 81 MMHG | TEMPERATURE: 97.8 F | HEART RATE: 69 BPM | SYSTOLIC BLOOD PRESSURE: 121 MMHG | RESPIRATION RATE: 17 BRPM

## 2021-10-26 PROCEDURE — 74011250636 HC RX REV CODE- 250/636: Performed by: INTERNAL MEDICINE

## 2021-10-26 PROCEDURE — 96366 THER/PROPH/DIAG IV INF ADDON: CPT

## 2021-10-26 PROCEDURE — 77030012965 HC NDL HUBR BBMI -A

## 2021-10-26 PROCEDURE — 96365 THER/PROPH/DIAG IV INF INIT: CPT

## 2021-10-26 RX ADMIN — SODIUM CHLORIDE 25 ML/HR: 900 INJECTION, SOLUTION INTRAVENOUS at 15:21

## 2021-10-26 RX ADMIN — INFLIXIMAB 100 MG: 100 INJECTION, POWDER, LYOPHILIZED, FOR SOLUTION INTRAVENOUS at 16:17

## 2021-10-26 NOTE — PROGRESS NOTES
[facility] Chemo Progress Note    Date: 2021    Name: Orlando Montaño    MRN: [id number]         : 1970    1500 Ms. Richardson Daughters Arrived to [facility] for Remicade ambulatory in stable condition. Assessment was completed, no acute issues at this time, no new complaints voiced. Port accessed with positive blood return. Labs drawn and sent for processing. Patient denies SOB, fever, cough, general not feeling well. Patient denies recent exposure to someone who has tested positive for COVID-19. Patient denies having contact with anyone who has a pending COVID test.      Ms. Ludwig's vitals were reviewed. Patient Vitals for the past 12 hrs:   Temp Pulse Resp BP   10/26/21 1628 97.8 °F (36.6 °C) 82 17 130/83                  Medications Administered     0.9% sodium chloride infusion     Admin Date  10/26/2021 Action  New Bag Dose  25 mL/hr Rate  25 mL/hr Route  IntraVENous Administered By  Jacobo Li RN          inFLIXimab (REMICADE) 100 mg in 0.9% sodium chloride 250 mL, overfill volume 25 mL infusion     Admin Date  10/26/2021 Action  New Bag Dose  100 mg Rate  138 mL/hr Route  IntraVENous Administered By  Jacobo Li RN                  6102 Patient tolerated treatment well. Port maintained positive blood return throughout treatment. Port flushed, heparinized and de accessed per protocol. Patient was discharged from [facility] in stable condition. Patient aware of next appointment.      Future Appointments   Date Time Provider Bhumi Wolf   2021  1:00 PM [address] [facility]  2021

## 2021-11-08 ENCOUNTER — APPOINTMENT (OUTPATIENT)
Dept: INFUSION THERAPY | Age: 51
End: 2021-11-08

## 2021-12-07 ENCOUNTER — HOSPITAL ENCOUNTER (OUTPATIENT)
Dept: INFUSION THERAPY | Age: 51
Discharge: HOME | End: 2021-12-07

## 2021-12-14 RX ORDER — ACETAMINOPHEN 325 MG/1
650 TABLET ORAL
Status: ACTIVE | OUTPATIENT
Start: 2021-12-21 | End: 2021-12-21

## 2021-12-14 RX ORDER — SODIUM CHLORIDE 9 MG/ML
25 INJECTION, SOLUTION INTRAVENOUS CONTINUOUS
Status: DISPENSED | OUTPATIENT
Start: 2021-12-21 | End: 2021-12-21

## 2021-12-14 RX ORDER — DIPHENHYDRAMINE HYDROCHLORIDE 50 MG/ML
50 INJECTION, SOLUTION INTRAMUSCULAR; INTRAVENOUS
Status: ACTIVE | OUTPATIENT
Start: 2021-12-21 | End: 2021-12-21

## 2021-12-14 RX ORDER — ACETAMINOPHEN 325 MG/1
650 TABLET ORAL ONCE
Status: ACTIVE | OUTPATIENT
Start: 2021-12-21 | End: 2021-12-21

## 2021-12-21 ENCOUNTER — HOSPITAL ENCOUNTER (OUTPATIENT)
Dept: INFUSION THERAPY | Age: 51
Discharge: HOME | End: 2021-12-21
Payer: COMMERCIAL

## 2021-12-21 VITALS
RESPIRATION RATE: 16 BRPM | SYSTOLIC BLOOD PRESSURE: 119 MMHG | HEART RATE: 84 BPM | DIASTOLIC BLOOD PRESSURE: 79 MMHG | TEMPERATURE: 96 F

## 2021-12-21 PROCEDURE — 74011250636 HC RX REV CODE- 250/636: Performed by: INTERNAL MEDICINE

## 2021-12-21 PROCEDURE — 96365 THER/PROPH/DIAG IV INF INIT: CPT

## 2021-12-21 PROCEDURE — 96366 THER/PROPH/DIAG IV INF ADDON: CPT

## 2021-12-21 PROCEDURE — 77030012965 HC NDL HUBR BBMI -A

## 2021-12-21 RX ADMIN — SODIUM CHLORIDE 25 ML/HR: 900 INJECTION, SOLUTION INTRAVENOUS at 16:11

## 2021-12-21 RX ADMIN — INFLIXIMAB 100 MG: 100 INJECTION, POWDER, LYOPHILIZED, FOR SOLUTION INTRAVENOUS at 16:12

## 2021-12-21 NOTE — PROGRESS NOTES
[facility] Short Note                       Date: 2021    Name: Jolanta Alcala    MRN: [id number]         : 1970    1500 Pt admit to [facility] for Remicade ambulatory in stable condition. Assessment completed. No new concerns voiced. Patient denies SOB, fever, cough, general not feeling well. Patient denies recent exposure to someone who has tested positive for COVID-19. Patient denies having contact with anyone who has a pending COVID test.    Ms. Ludwig's vitals were reviewed prior to treatment. Patient Vitals for the past 12 hrs:   Temp Pulse Resp BP   21 1506 (!) 96 °F (35.6 °C) 84 16 119/79       Port with positive blood return flushed, heparinized and de-accessed per protocol. Medications given:   Medications Administered     0.9% sodium chloride infusion     Admin Date  2021 Action  New Bag Dose  25 mL/hr Rate  25 mL/hr Route  IntraVENous Administered By  Mick Li RN          inFLIXimab (REMICADE) 100 mg in 0.9% sodium chloride 250 mL, overfill volume 25 mL infusion     Admin Date  2021 Action  New Bag Dose  100 mg Route  IntraVENous Administered By  Mick Li RN                   5618 Pt tolerated treatment well. D/c home ambulatory in no distress.      Future Appointments   Date Time Provider Bhumi Wolf   2/15/2022  2:00 PM C1 [facility] MED [facility]   2022  1:00 PM C1 VU MED [id number] Татьяна Mcclain RN  2021  6:32 PM

## 2022-01-03 ENCOUNTER — APPOINTMENT (OUTPATIENT)
Dept: INFUSION THERAPY | Age: 52
End: 2022-01-03

## 2022-02-08 RX ORDER — DIPHENHYDRAMINE HYDROCHLORIDE 50 MG/ML
50 INJECTION, SOLUTION INTRAMUSCULAR; INTRAVENOUS
Status: DISCONTINUED | OUTPATIENT
Start: 2022-02-15 | End: 2022-02-16 | Stop reason: HOSPADM

## 2022-02-08 RX ORDER — SODIUM CHLORIDE 9 MG/ML
25 INJECTION, SOLUTION INTRAVENOUS CONTINUOUS
Status: DISCONTINUED | OUTPATIENT
Start: 2022-02-15 | End: 2022-02-16 | Stop reason: HOSPADM

## 2022-02-08 RX ORDER — ACETAMINOPHEN 325 MG/1
650 TABLET ORAL ONCE
Status: ACTIVE | OUTPATIENT
Start: 2022-02-15 | End: 2022-02-15

## 2022-02-08 RX ORDER — ACETAMINOPHEN 325 MG/1
650 TABLET ORAL
Status: DISCONTINUED | OUTPATIENT
Start: 2022-02-15 | End: 2022-02-16 | Stop reason: HOSPADM

## 2022-02-15 ENCOUNTER — HOSPITAL ENCOUNTER (OUTPATIENT)
Dept: INFUSION THERAPY | Age: 52
Discharge: HOME | End: 2022-02-15

## 2022-02-15 RX ORDER — SODIUM CHLORIDE 9 MG/ML
25 INJECTION, SOLUTION INTRAVENOUS CONTINUOUS
Status: DISPENSED | OUTPATIENT
Start: 2022-02-22 | End: 2022-02-22

## 2022-02-15 RX ORDER — ACETAMINOPHEN 325 MG/1
650 TABLET ORAL
Status: ACTIVE | OUTPATIENT
Start: 2022-02-22 | End: 2022-02-22

## 2022-02-15 RX ORDER — DIPHENHYDRAMINE HYDROCHLORIDE 50 MG/ML
50 INJECTION, SOLUTION INTRAMUSCULAR; INTRAVENOUS
Status: ACTIVE | OUTPATIENT
Start: 2022-02-22 | End: 2022-02-22

## 2022-02-15 RX ORDER — ACETAMINOPHEN 325 MG/1
650 TABLET ORAL ONCE
Status: ACTIVE | OUTPATIENT
Start: 2022-02-22 | End: 2022-02-22

## 2022-02-22 ENCOUNTER — HOSPITAL ENCOUNTER (OUTPATIENT)
Dept: INFUSION THERAPY | Age: 52
Discharge: HOME | End: 2022-02-22
Payer: COMMERCIAL

## 2022-02-22 VITALS
RESPIRATION RATE: 17 BRPM | SYSTOLIC BLOOD PRESSURE: 114 MMHG | TEMPERATURE: 97.3 F | DIASTOLIC BLOOD PRESSURE: 75 MMHG | HEART RATE: 72 BPM

## 2022-02-22 PROCEDURE — 96366 THER/PROPH/DIAG IV INF ADDON: CPT

## 2022-02-22 PROCEDURE — 74011250636 HC RX REV CODE- 250/636: Performed by: INTERNAL MEDICINE

## 2022-02-22 PROCEDURE — 77030012965 HC NDL HUBR BBMI -A

## 2022-02-22 PROCEDURE — 74011000250 HC RX REV CODE- 250: Performed by: INTERNAL MEDICINE

## 2022-02-22 PROCEDURE — 96365 THER/PROPH/DIAG IV INF INIT: CPT

## 2022-02-22 RX ORDER — SODIUM CHLORIDE 0.9 % (FLUSH) 0.9 %
5-10 SYRINGE (ML) INJECTION AS NEEDED
Status: DISCONTINUED | OUTPATIENT
Start: 2022-02-22 | End: 2022-02-23 | Stop reason: HOSPADM

## 2022-02-22 RX ORDER — HEPARIN 100 UNIT/ML
500 SYRINGE INTRAVENOUS AS NEEDED
Status: DISCONTINUED | OUTPATIENT
Start: 2022-02-22 | End: 2022-02-23 | Stop reason: HOSPADM

## 2022-02-22 RX ADMIN — Medication 500 UNITS: at 13:21

## 2022-02-22 RX ADMIN — SODIUM CHLORIDE 25 ML/HR: 900 INJECTION, SOLUTION INTRAVENOUS at 10:21

## 2022-02-22 RX ADMIN — SODIUM CHLORIDE, PRESERVATIVE FREE 10 ML: 5 INJECTION INTRAVENOUS at 13:21

## 2022-02-22 RX ADMIN — INFLIXIMAB 100 MG: 100 INJECTION, POWDER, LYOPHILIZED, FOR SOLUTION INTRAVENOUS at 11:13

## 2022-02-22 NOTE — PROGRESS NOTES
\[facility]\""  Progress Note    Date: 2022    Name: Tanner Pastrana    MRN: [id number]         : 1970    1006 Ms. Brando Clay Arrived to [facility] for Remicade infusion ambulatory in stable condition. Assessment was completed, no acute issues at this time, no new complaints voiced. Port accessed with positive blood return. Patient denies SOB, fever, cough, general not feeling well. Patient denies recent exposure to someone who has tested positive for COVID-19. Patient denies having contact with anyone who has a pending COVID test.      Ms. Ludwig's vitals were reviewed. Patient Vitals for the past 12 hrs:   Temp Pulse Resp BP   22 1311 -- 72 -- 114/75   22 1008 97.3 °F (36.3 °C) 85 17 138/71         Lab results were obtained and reviewed. No results found for this or any previous visit (from the past 12 hour(s)). Pre-medications  were administered as ordered and chemotherapy was initiated. Medications Administered     0.9% sodium chloride infusion     Admin Date  2022 Action  New Bag Dose  25 mL/hr Rate  25 mL/hr Route  IntraVENous Administered By  Leon Warren RN          heparin (porcine) pf 500 Units     Admin Date  2022 Action  Given Dose  500 Units Route  IntraVENous Administered By  Leon Warren RN          inFLIXimab (REMICADE) 100 mg in 0.9% sodium chloride 250 mL, overfill volume 25 mL infusion     Admin Date  2022 Action  New Bag Dose  100 mg Rate  138 mL/hr Route  IntraVENous Administered By  Leon Warren RN          sodium chloride (NS) flush 5-10 mL     Admin Date  2022 Action  Given Dose  10 mL Route  IntraVENous Administered By  Leon Warren RN                  9483 Patient tolerated treatment well. Port maintained positive blood return throughout treatment. Port flushed, heparinized and de accessed per protocol. Patient was discharged from [facility] in stable condition. Patient aware of next appointment.      Future Appointments   Date Time Provider Bhumi Wolf   4/12/2022  1:00 PM [address] [facility] Madyson Abdalla RN  February 22, 2022

## 2022-04-12 ENCOUNTER — HOSPITAL ENCOUNTER (OUTPATIENT)
Dept: INFUSION THERAPY | Age: 52
End: 2022-04-12

## 2022-04-12 RX ORDER — DIPHENHYDRAMINE HYDROCHLORIDE 50 MG/ML
50 INJECTION, SOLUTION INTRAMUSCULAR; INTRAVENOUS
Status: DISCONTINUED | OUTPATIENT
Start: 2022-04-19 | End: 2022-04-20 | Stop reason: HOSPADM

## 2022-04-12 RX ORDER — ACETAMINOPHEN 325 MG/1
650 TABLET ORAL ONCE
Status: ACTIVE | OUTPATIENT
Start: 2022-04-19 | End: 2022-04-19

## 2022-04-12 RX ORDER — SODIUM CHLORIDE 9 MG/ML
25 INJECTION, SOLUTION INTRAVENOUS CONTINUOUS
Status: DISCONTINUED | OUTPATIENT
Start: 2022-04-19 | End: 2022-04-20 | Stop reason: HOSPADM

## 2022-04-12 RX ORDER — ACETAMINOPHEN 325 MG/1
650 TABLET ORAL
Status: DISCONTINUED | OUTPATIENT
Start: 2022-04-19 | End: 2022-04-20 | Stop reason: HOSPADM

## 2022-04-19 ENCOUNTER — HOSPITAL ENCOUNTER (OUTPATIENT)
Dept: INFUSION THERAPY | Age: 52
Discharge: HOME | End: 2022-04-19

## 2022-04-27 RX ORDER — ACETAMINOPHEN 325 MG/1
650 TABLET ORAL
Status: ACTIVE | OUTPATIENT
Start: 2022-04-29 | End: 2022-04-29

## 2022-04-27 RX ORDER — ACETAMINOPHEN 325 MG/1
650 TABLET ORAL ONCE
Status: ACTIVE | OUTPATIENT
Start: 2022-04-29 | End: 2022-04-29

## 2022-04-27 RX ORDER — DIPHENHYDRAMINE HYDROCHLORIDE 50 MG/ML
50 INJECTION, SOLUTION INTRAMUSCULAR; INTRAVENOUS
Status: ACTIVE | OUTPATIENT
Start: 2022-04-29 | End: 2022-04-29

## 2022-04-27 RX ORDER — SODIUM CHLORIDE 9 MG/ML
25 INJECTION, SOLUTION INTRAVENOUS CONTINUOUS
Status: DISPENSED | OUTPATIENT
Start: 2022-04-29 | End: 2022-04-29

## 2022-04-29 ENCOUNTER — HOSPITAL ENCOUNTER (OUTPATIENT)
Dept: INFUSION THERAPY | Age: 52
Discharge: HOME | End: 2022-04-29
Payer: COMMERCIAL

## 2022-04-29 VITALS
BODY MASS INDEX: 32.47 KG/M2 | WEIGHT: 202 LBS | HEART RATE: 73 BPM | SYSTOLIC BLOOD PRESSURE: 121 MMHG | DIASTOLIC BLOOD PRESSURE: 81 MMHG | TEMPERATURE: 97.6 F | RESPIRATION RATE: 18 BRPM | HEIGHT: 66 IN

## 2022-04-29 PROCEDURE — 96365 THER/PROPH/DIAG IV INF INIT: CPT

## 2022-04-29 PROCEDURE — 74011000250 HC RX REV CODE- 250: Performed by: INTERNAL MEDICINE

## 2022-04-29 PROCEDURE — 96366 THER/PROPH/DIAG IV INF ADDON: CPT

## 2022-04-29 PROCEDURE — 74011250636 HC RX REV CODE- 250/636: Performed by: INTERNAL MEDICINE

## 2022-04-29 RX ORDER — SODIUM CHLORIDE 0.9 % (FLUSH) 0.9 %
10 SYRINGE (ML) INJECTION AS NEEDED
Status: DISCONTINUED | OUTPATIENT
Start: 2022-04-29 | End: 2022-05-01 | Stop reason: HOSPADM

## 2022-04-29 RX ORDER — HEPARIN 100 UNIT/ML
500 SYRINGE INTRAVENOUS AS NEEDED
Status: DISCONTINUED | OUTPATIENT
Start: 2022-04-29 | End: 2022-04-30 | Stop reason: HOSPADM

## 2022-04-29 RX ADMIN — SODIUM CHLORIDE, PRESERVATIVE FREE 10 ML: 5 INJECTION INTRAVENOUS at 18:21

## 2022-04-29 RX ADMIN — SODIUM CHLORIDE, PRESERVATIVE FREE 10 ML: 5 INJECTION INTRAVENOUS at 15:21

## 2022-04-29 RX ADMIN — SODIUM CHLORIDE 100 MG: 9 INJECTION, SOLUTION INTRAVENOUS at 16:11

## 2022-04-29 RX ADMIN — SODIUM CHLORIDE 25 ML/HR: 9 INJECTION, SOLUTION INTRAVENOUS at 15:30

## 2022-04-29 RX ADMIN — HEPARIN 500 UNITS: 100 SYRINGE at 18:21

## 2022-04-29 RX ADMIN — SODIUM CHLORIDE, PRESERVATIVE FREE 10 ML: 5 INJECTION INTRAVENOUS at 15:20

## 2022-04-29 NOTE — PROGRESS NOTES
[facility] [place] VISIT NOTE    1505  Pt arrived at [facility] ambulatory and in no distress for Remicade infusion. Assessment completed, no new complaints at this time. Pt denies known COVID exposure and symptoms. Right chest port accessed with 1 in peoples with no difficulty. Positive blood return noted. Medications received:  Pt refused premeds stating she take her own at home  Remicade IV over 2 hours    Patient Vitals for the past 12 hrs:   Temp Pulse Resp BP   04/29/22 1813  73 18 121/81   04/29/22 1509 97.6 °F (36.4 °C) 78 18 132/89     Tolerated treatment well, no adverse reaction noted. Port de-accessed and flushed per protocol. Positive blood return noted. 1825  D/C'd from [facility] ambulatory and in no distress. Next appointment is 6/29/22.

## 2022-06-22 RX ORDER — ACETAMINOPHEN 325 MG/1
650 TABLET ORAL
Status: DISCONTINUED | OUTPATIENT
Start: 2022-06-29 | End: 2022-06-30 | Stop reason: HOSPADM

## 2022-06-22 RX ORDER — DIPHENHYDRAMINE HYDROCHLORIDE 50 MG/ML
50 INJECTION, SOLUTION INTRAMUSCULAR; INTRAVENOUS
Status: DISCONTINUED | OUTPATIENT
Start: 2022-06-29 | End: 2022-06-30 | Stop reason: HOSPADM

## 2022-06-22 RX ORDER — ACETAMINOPHEN 325 MG/1
650 TABLET ORAL ONCE
Status: ACTIVE | OUTPATIENT
Start: 2022-06-29 | End: 2022-06-29

## 2022-06-22 RX ORDER — SODIUM CHLORIDE 9 MG/ML
25 INJECTION, SOLUTION INTRAVENOUS CONTINUOUS
Status: DISCONTINUED | OUTPATIENT
Start: 2022-06-29 | End: 2022-06-30 | Stop reason: HOSPADM

## 2022-06-29 ENCOUNTER — HOSPITAL ENCOUNTER (OUTPATIENT)
Dept: INFUSION THERAPY | Age: 52
Discharge: HOME | End: 2022-06-29
Payer: COMMERCIAL

## 2022-06-29 VITALS — RESPIRATION RATE: 16 BRPM | DIASTOLIC BLOOD PRESSURE: 73 MMHG | HEART RATE: 85 BPM | SYSTOLIC BLOOD PRESSURE: 128 MMHG

## 2022-06-29 PROCEDURE — 96366 THER/PROPH/DIAG IV INF ADDON: CPT

## 2022-06-29 PROCEDURE — 96365 THER/PROPH/DIAG IV INF INIT: CPT

## 2022-06-29 PROCEDURE — 74011250636 HC RX REV CODE- 250/636: Performed by: INTERNAL MEDICINE

## 2022-06-29 RX ADMIN — INFLIXIMAB 100 MG: 100 INJECTION, POWDER, LYOPHILIZED, FOR SOLUTION INTRAVENOUS at 15:37

## 2022-06-29 NOTE — PROGRESS NOTES
TRANSFER - IN REPORT:    Verbal report received from Ana Maria(diane) on [facility]  for routine progression of care      Report consisted of patients Situation, Background, Assessment and   Recommendations(SBAR). Information from the following report(s) SBAR was reviewed with the receiving nurse. Patient tolerated treatment well. Patient discharged from [place] [address].

## 2022-06-29 NOTE — PROGRESS NOTES
[facility] Peds/Adult Note                       Date: 2022    Name: Anny Waddell    MRN: [id number]         : 1970    1505 Patient arrives for Remicade without acute problems. Please see [facility] for complete assessment and education provided. Prior to treatment, patient was screened for COVID 19. Patient denies any signs or symptoms of COVID. Denies any known physical contact with anyone diagnosed with, or with pending or positive COVID test. Denies a pending or positive COVID test himself. Vital signs stable throughout and prior to discharge. Patient tolerated procedure well and was discharged without incident. Patient is aware of next [facility] appointment on 2022 Appointment card give to the Patient      Ms. Ludwig's vitals were reviewed prior to and after treatment. Patient Vitals for the past 12 hrs:   Pulse Resp BP   22 1507 90 16 134/86         Lab results were obtained and reviewed. No results found for this or any previous visit (from the past 12 hour(s)). Medications given:   Medications Administered     inFLIXimab (REMICADE) 100 mg in 0.9% sodium chloride 250 mL, overfill volume 25 mL infusion     Admin Date  2022 Action  New Bag Dose  100 mg Rate  10 mL/hr Route  IntraVENous Administered By  Roge Stock RN           Admin Date  2022 Action  Rate Change Dose   Rate  20 mL/hr Route  IntraVENous Administered By  Roge Stock RN           Admin Date  2022 Action  Rate Change Dose   Rate  40 mL/hr Route  IntraVENous Administered By  Roge Stock RN           Admin Date  2022 Action  Rate Change Dose   Rate  80 mL/hr Route  IntraVENous Administered By  Roge Stock RN           Admin Date  2022 Action  Rate Change Dose   Rate  299 mL/hr Route  IntraVENous Administered By  Roge Stock RN              [phone]: Report given to Criss García RN.  Transferred care to [place] to complete infusion and de-access patient at completion. Ms. Genaro Nova tolerated the infusion, and had no complaints. Ms. Genaro Nova was discharged from [place] [address] in stable condition. Discharge Instructions provided to patient, patient verbalized understanding but denied the request for a copy of d/c instructions.      Future Appointments   Date Time Provider Bhumi Wolf   8/24/2022  2:00 PM B3 VU MED [address] [place] [address] 'D' [facility]   10/19/2022  2:00 PM B3 VU MED [address] [place] [address] 'D' [place]   12/14/2022  2:00 PM B3 VU MED [address] [place] [address] 'D' [place]       Sekou Warren RN  June 29, 2022  4:50 PM

## 2022-10-18 ENCOUNTER — TELEPHONE (OUTPATIENT)
Dept: PHARMACY | Age: 52
End: 2022-10-18

## 2022-10-18 NOTE — TELEPHONE ENCOUNTER
Specialty Medication Service    Date: 10/18/2022  Patient's Name: Kenneth Mcnair YOB: 1970            _____________________________________________________________________________________________    Left message to schedule Initial [facility] PharmD  appointment for Specialty Medication Services. Please call: [phone] option 4. Will continue to outreach as appropriate.       Fany LamD [facility]  Ambulatory Clinical Pharmacist  Specialty Medication Services  Phone: [phone]

## 2022-10-18 NOTE — LETTER
10/19/2022 9:54 AM      Dr. Shar Adams,     I am a pharmacist with the Specialty Medication Service offered through [facility] and we have mutual patient: Garrett Coburn ( 1970). I am scheduled to speak with the patient on  to discuss their Remicade. If the patient has a signed Release of Information (FOREST) form on file, please fax any office visit notes to our office at [phone]  so we can add it to the patients chart with [facility] ([facility]).            Thank you in advance,       Kelly Lee, [address] [facility] [facility] Pharmacist  Specialty Medication Service  Telephone: [phone] option 4   Fax: [phone]   Email: [email or web address]

## 2022-10-19 NOTE — TELEPHONE ENCOUNTER
Specialty Medication Service    Date: 10/19/2022  Patient's Name: Kelly Lee YOB: 1970            _____________________________________________________________________________________________    Initial PharmD visit scheduled for 10/24/2022. Please schedule with Medical Director. 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 Notes requested from specialist via fax.      Fabián Ortiz CPhT  Clinical    Specialty Medication Service   [phone] option [phone] in place: No  Recommendation Provided To: Provider: 1 via Fax sent to office  and Patient/Caregiver: 2 via Telephone  Intervention Detail: Scheduled Appointment  Gap Closed?: Yes  Intervention Accepted By: Patient/Caregiver: 1  Time Spent (min): 15

## 2022-10-21 NOTE — PROGRESS NOTES
Specialty Medication Service    Patient's Name: Nayeli Chapin YOB: 1970      Reason for visit: Nayeli Chapin is a 49 y.o. female presenting today for Specialty Medication Service visit. Patient is prescribed SMS formulary medication, Infliximab. Medication list updated. Specialty Medication: Infliximab 5mg/kg  Frequency: every 6 weeks  Indication: Seronegative Rheumatoid Arthritis/ Ulcerative colitis  Initially Diagnosed: 2022 RA; 25 years ago UC  Additional Therapy:   Prednisone for flares  Tramadol as needed pain  Previous Therapy:   None    Specialist:   Poornima Sanchez MD   [address]  Phone: [phone]    Specialist Progress Note Available: Yes, Epic Media (Ext Chart Notes)  Last Specialist Visit: 06/14/2022: Patient being treated with Remicade for over 10 years with excellent control of Ulcerative colitis on every 8 week dosing. Was late on most recent dose due to insurance delay and developed a severe flare (no GI symptoms but symmetric swelling in hands, wrists, elbows and knees). Inflammatory markers significant elevation, partially responded to prednisone (ongoing symptoms and side effects). Tolerated Remicade. A/P: Diagnosis of seronegative RA, refill prednisone, tramadol and continue Remicade every 8 weeks (follow-up 2 weeks). Specialist:   Yomaira Wiley MD  [facility], [address] [place] [address] [address], [address]  Phone: [phone]  Specialist Progress Note Available: Yes, Epic Media (GI 2020)  Last Specialist Visit: 02/20/2020: Patient follow-up for UC. Remains asymptomatic on Remicade every 8 weeks. Last colonoscopy 2018 showed pseudopolyps throughout the colon. History of GERD. Noticing arthritis right before next infusion. Discussed potentially stopping Remicade since patient has been stable, but she want to continue. Follow-up as needed.      Allergies   Allergen Reactions Nsaids (Non-Steroidal Anti-Inflammatory Drug) Other (comments)     Exacerbation of Ulcerative Colitis/history of ulcers    Plantain Itching     Hives and itching. Might have been due to pesticides on plantain in [place]. Sulfa (Sulfonamide Antibiotics) Other (comments)     nausea        Past Medical History:   Diagnosis Date    Asthma     GERD (gastroesophageal reflux disease)     Ulcerative colitis     Ulcerative colitis ([facility] [address]       Social History     Tobacco Use    Smoking status: Never    Smokeless tobacco: Never   Substance Use Topics    Alcohol use: Yes     Comment: social     No family history on file. REVIEW OF CURRENT DISEASE STATE  Rheumatoid Arthritis: Patient with diagnosis of rheumatoid arthritis being seen in regards to specialty medication use. Patient has been stable on Remicade for >10 years for ulcerative colitis, but this past January an insurance issue caused her to miss her infusions. She did not experience any symptoms of her Ulcerative colitis during this time, but reports in April she experienced an arthritis flare -  swelling in hands, wrists, elbows and knees. Patient could not make a fist. Feels like walking on a golf ball. Gained 12 pounds from swelling. Patient saw PCP and they took labs and determined she needed to be seen by rheumatologist. Patient was treated with steroids and diagnosed with rheumatoid arthritis. Her dose or Remicade was also switched from every 8 weeks to every 6 weeks as well. Patient reports currently all her arthritis symptoms have resolved, except for her right foot. Experiencing significant swelling that can make it hard to walk and pain is 4-5/10 daily. Patient is not taking anything for pain because previous ulcers prevent her from using NSAIDs and patient not comfortable using tramadol rheumatologist prescribed. Nor is patient interested in using tylenol arthritis.   Patient is tough and will just work thru the pain and still running 5 days per week (~30 - 45 minutes). No other symptoms or complaints at this time. Patient can do all ADL and rapid 3 today shows moderate disease activity. RAPID3 - Routine Assessment of Patient Index Data   Over the last week,  were you able to:   0: Without ANY difficulty  1: With SOME difficulty  2: With [facility] difficulty  3: UNABLE to do    Score   1. Dress yourself, including tying shoelaces and doing buttons? 0    Get in and out of bed? 0    Lift a full cup or glass to your mouth? 0    Walk outdoors on flat ground? 1    Wash and dry your entire body? 0    Bend down to  clothing from the floor? 0    Turn regular faucets on and off? 0    Get in and out of a car, bus, train, or airplane? 0    Walk two miles or three kilometers, if you wish? 1    Participate in recreational activities and sports as you would like, if you wish? 1    Get a good night's sleep? 0    Deal with feelings of anxiety or being nervous? 0    Deal with feelings of depression or feeling blue? 0   2. How much pain have you had because of your condition over the past week? (Scale 0 - 10) 3   3. Considering all the ways in which illness and health conditions may affect you at this time, how are you doing? (Scale 0 - 10) 3.5   Final Score: 7.5 ; moderate activity      Inflammatory Bowel Disease: Patient with diagnosis of ulcerative colitis for over 25 years. Disease has involved entire colon. The patient has had no surgery for treatment of their IBD. Has been on Remicade for >10 years. Prior, she at least 15 blood transfusions due to anemia. Patient reports her diease has been under control with Remicade the entire time (even during treatment lapse this year). No flares or concerns. The patient is currently having 1 bowel movements per day which are semisolid. The patient currently denies significant abdominal pain or discomfort. .  The patient does not have fever. The patient does not have weight loss.       · Are you currently having a flare?no  · Considering all the ways in which this condition and others affects you, how are you doing (0 = very well, 10 = very poorly)? 4  · How would you rate your pain on average? (0 = no pain, 10 = worst pain imaginable) 3  · During the past 4 weeks, have you missed any planned activities of daily living due to condition (work/school/other plans)? No  · During the past 4 weeks, have you had to seek urgent care, ER admission, Unplanned doctor office visit, or hospital admission? No    MEDICATIONS  Current Outpatient Medications   Medication Sig Dispense Refill    inFLIXimab (REMICADE) 100 mg injection Infuse 468 mg IV every 6 weeks      sucralfate (CARAFATE) 1 gram tablet Take 1 g by mouth four (4) times daily. As needed for stomach ulcers      albuterol (PROVENTIL HFA, VENTOLIN HFA, PROAIR HFA) 90 mcg/actuation inhaler Take 2 Puffs by inhalation every four (4) hours as needed for Wheezing. 1 Inhaler 0    multivit with iron,minerals (MULTIVITAMIN AND MINERALS PO) Take 1 tablet by mouth daily. pantoprazole (PROTONIX) 40 mg tablet Take 40 mg by mouth daily. Current Specialty Medication:   Infliximab (Remicade/Renflexis/Inflectra)   Indication Specific Recommended dose:   Rheumatoid Arthritis (with methotrexate): 3 mg/kg at weeks 0, 2, and 6 followed by 3 mg/kg every 8 weeks; Patients with incomplete response, consider adjusting dose up to 10 mg/kg every 8 weeks or treating as often as every 4 weeks  Warnings/Precautions: Autoimmune disorders; Cardiovascular/cerebrovascular reactions during and following infusion; leukopenia, neutropenia, thrombocytopenia, pancytopenia; Hepatic reactions; Reactivation of hepatitis B virus; Hypersensitivity or infusion reactions; Infections: [US Boxed Warning]; Malignancy: [US Boxed Warning]; Tuberculosis: [US Boxed Warning]; Demyelinating CNS disease (initiation); worsening and new-onset HF has been reported;    Recommended Monitoring: Vitals during infusion; signs/symptoms of infection; CBC with differential; LFTs; TB screening prior to initiating and during therapy; signs/symptoms/worsening of heart failure; signs and symptoms of malignancy (eg, splenomegaly, hepatomegaly, abdominal pain, persistent fever, night sweats, weight loss). Storage: Refrigerate at [facility] to [facility] (36ºF to 46ºF). Do not use REMICADE beyond the expiration date printed on carton and the vial. This product contains no preservative. Handling: Must be administered by healthcare professional  Patient Reported Side Effects: none    Specialty Medication Start Date: 10 years  Appropriate Dose: Yes  Last tuberculin Test: 08/25/2022   - Result: Negative    Describe your medication adherence over the last 4 weeks: Excellent  How many doses have you missed in the last 4 weeks, if any? 0  How confident are you to follow the injection process and the treatment plan? (0-10) n/a Who injects? Infusion center  Does the patient have a current infection of any kind? No    Contraindications to therapy present? No    Drug Interactions: The following clinically significant interactions were identified via Toptal Interaction Analysis as category D or higher: multivitamin + sucralfate: increased drug concentrations of sucralfate. Separate doses by 2 hours. Patient states only using sucralfate as needed for ulcers, but was unaware to separate.  _____________________________________________________________________  Renal Dosing:  Creatinine Clearance: CrCl cannot be calculated (Patient's most recent lab result is older than the maximum 180 days allowed. ). No renal adjustments necessary.     LABS    Labs 08/19/2022 - Epic Media Ext Chart Notes    CBC w/ diff: All WNL  CMP: All WNL, except  ALT 11, AST 11 (L), ALP 78  CRP: 1.18 ; 2.65 (5/17/22)  ESR: 41, 50 (5/17/22)  HepB Surf Ag: Negative  HepC Vir Ab: <0.1  Quantiferon: negative  Ra latex turbid <10  CCP Ab: 5  Lab Results   Component Value Date/Time    BUN 11 08/02/2021 05:33 AM     Lab Results   Component Value Date/Time    WBC 9.9 08/02/2021 05:33 AM    HGB 10.9 (L) 08/02/2021 05:33 AM    HCT 34.0 (L) 08/02/2021 05:33 AM    RBC 4.18 08/02/2021 05:33 AM    PLATELET 919 63/41/2210 05:33 AM     Lab Results   Component Value Date/Time    ALT (SGPT) 26 08/02/2021 05:33 AM    AST (SGOT) 13 (L) 08/02/2021 05:33 AM       IMMUNIZATIONS  Immunization History   Administered Date(s) Administered    Influenza Vaccine 01/01/2018, 11/16/2021      Immunization status: up to date and documented, missing doses of flu, [id number], Shingrix, Covid. ASSESSMENTS  No flowsheet data found. No flowsheet data found. [id number] Questions Score   In general, would you say your health is: 2   2. In general, would you say your quality of life is: 5   3. In general, how would you rate your physical health? 4   4. In general, how would you rate your mental health, including mood and your ability to think? 5   5. In general, how would you rate your satisfaction with your social activities and relationships? 5   6. In general, please rate how well you carry out your usual social activities and roles. 1   7. To what extent are you able to carry out your everyday physical activities such as walking, climbing stairs, carrying groceries, or moving a chair? 4   8. In the past 7 days, how often have you been bothered by emotional problems such as feeling anxious, depressed or irritable? 5   9. In the past 7 days, how would you rate your fatigue on average? 5   10. How would you rate your pain on average? 3   Promise Physical Score (Questions: 3, 7, 9, 10) 16   Promise Mental Score (Questions: 2, 4, 5, 8) 20       Rheumatoid Arthritis  Kelly Lee is a 49 y.o. female being treated for Rheumatoid Arthritis. Medication reconciliation completed (Patient provided), one drug-drug interactions identified.  Multivitamin + Sucralfate: increased sucralfate concentrations, separate doses by 2 hours. Patient only using sucralfate as needed but was not aware and will make the change in future. Allergy and diagnosis info reviewed and updated. Maciej Weldon has a history remarkable for the following conditions: ulcerative colitis, ulcers  The patient has previously been treated with Remicade every 8 weeks. Worked well for UC, but developed RA recently to potentially being out of medication caused by insurance. Current therapy includes: Remicade 5mg/kg every 6 weeks. Warnings, precautions, and contraindications reviewed with the patient. Also reviewed storage, administration, and proper disposal.  Current disease state symptoms include: significant swelling in right foot, but all other arthritis symptoms have improved. Medication Effectiveness: Patient disease is well controlled on current therapy. Patient states she continues to improve and her foot is the last issue. Reviewed potential side effects/ADEs. No side effects/adverse events reported, and no adherence issues identified. Functional and cognitive limitations include: None, patient is running 5 days per week. Just having to go slower. Reviewed copay and advised patient that they will receive a copy of the patient rights and responsibility document with their welcome packet in the mail. Patient is not considered high risk. Based on patient feedback/results of the assessment, the therapy is  still appropriate. No medication-related problem(s) or patient need(s) identified that would require a care plan. Follow up in 90 days      2.   ulcerative colitis  Maciej Weldon is a 49 y.o. female being treated for ulcerative colitis. Medication reconciliation completed (Patient provided), one drug-drug interactions identified. Multivitamin + Sucralfate: increased sucralfate concentrations, separate doses by 2 hours. Patient only using sucralfate as needed but was not aware and will make the change in future.  Allergy and diagnosis info reviewed and updated. Tyrese Shetty has a history remarkable for the following conditions: ulcers, rheumatoid arthritis. The patient has previously been treated with Remicade every 8 weeks. Complete disease management. Current therapy includes: Remicade ever 6 weeks to help control arthritis. Warnings, precautions, and contraindications reviewed with the patient. Also reviewed storage, administration, and proper disposal.  Current disease state symptoms include: None, 1 BM/solid, denies abdominal pain, weight loss or any other symptoms. Medication Effectiveness: Patient disease is well controlled on current therapy. Reviewed potential side effects/ADEs. No side effects/adverse events reported, and no adherence issues identified. Functional and cognitive limitations include: none  Reviewed copay and advised patient that they will receive a copy of the patient rights and responsibility document with their welcome packet in the mail. Patient is not considered high risk. Based on patient feedback/results of the assessment, the therapy is  still appropriate. No medication-related problem(s) or patient need(s) identified that would require a care plan. Follow up in 180 days     3. Immunization  Immunization status: up to date and documented, missing doses of [id number], Flu, Shingrix, Covid booster. Flu: patient always obtains and plans to obtain. No questions or concerns. Follow-up next SMS. Covid booster: Patient not interested in at this time. Follow-up next SMS  [id number]: patient educated on benefits and where to obtain. Patient would like to pursue and has no questions or concerns. Follow-up next SMS. Shingrix: patient states she fainted from both doses of varicella vaccines and therefore does not want to pursue shingles vaccine. She also states she never had chickenpox. Advised patient to follow-up with PCP regarding her concerns if she does end up wanting to pursue in the future.  Patient provided verbal acknowledgment. No further follow-up at this time. 4. Drug Interaction  Multivitamin + Sucralfate: increased sucralfate concentrations, separate doses by 2 hours. Patient only using sucralfate as needed but was not aware and will make the change in future. No other concerns at this time. Follow-up as needed. PLAN  Goals of therapy, common side effects, medication storage, and administration reviewed with patient. Continue Infliximab 5mg/kg every 6 weeks as prescribed. Recommended monitoring to complete: CBC w/ diff, CMP, ESR, CRP (future)  Recommended vaccinations: Flu, Covid19 booster, [id number]  Keep all scheduled appointments.      Sean Ramirez, PharmD [facility]  Ambulatory Clinical Pharmacist  Specialty Medication Services  Phone: [phone] option #4  Fax: [phone]    For Pharmacy Admin Tracking Only    CPA in place: Yes  Recommendation Provided To: Provider: 1 via Note to Provider  and Patient/Caregiver: 3 via Telephone  Intervention Detail: Adherence Monitorin, Refill(s) Provided, Scheduled Appointment, and Vaccine Recommended/Administered  Intervention Accepted By: Provider: 1 and Patient/Caregiver: 3  Time Spent (min):  120

## 2022-10-24 ENCOUNTER — VIRTUAL VISIT (OUTPATIENT)
Dept: PHARMACY | Age: 52
End: 2022-10-24

## 2022-10-24 DIAGNOSIS — M06.09 RHEUMATOID ARTHRITIS OF MULTIPLE SITES WITH NEGATIVE RHEUMATOID FACTOR (HCC): Primary | ICD-10-CM

## 2022-10-24 RX ORDER — SUCRALFATE 1 G/1
1 TABLET ORAL 4 TIMES DAILY
COMMUNITY

## 2022-10-24 RX ORDER — INFLIXIMAB 100 MG/10ML
INJECTION, POWDER, LYOPHILIZED, FOR SOLUTION INTRAVENOUS
COMMUNITY
End: 2022-10-24 | Stop reason: SDUPTHER

## 2022-10-24 NOTE — Clinical Note
Lisa Chang. Completed pharmacy review for initial SMS visit. Patient is continuing on Remicade for RA/UC. Patient with recent diagnosis of RA and history of taking Remicade for UC for >10 years. UC completely stable, but new RA activity. Frequency just changed to every 6 weeks. No recommendations for therapy at this time. Patient is recommended for flu, C19 booster, [id number] vaccination. Medication pended for review. Will follow-up in 3 months.    Thank you, Lucio Galeano, PharmD [facility] Ambulatory Clinical Pharmacist Specialty Medication Services Phone: [phone] option #4 Fax: [phone]

## 2022-11-17 ENCOUNTER — OFFICE VISIT (OUTPATIENT)
Dept: PRIMARY CARE CLINIC | Age: 52
End: 2022-11-17
Payer: COMMERCIAL

## 2022-11-17 VITALS
HEART RATE: 80 BPM | OXYGEN SATURATION: 97 % | BODY MASS INDEX: 34.01 KG/M2 | WEIGHT: 211.6 LBS | SYSTOLIC BLOOD PRESSURE: 130 MMHG | TEMPERATURE: 97.7 F | HEIGHT: 66 IN | RESPIRATION RATE: 16 BRPM | DIASTOLIC BLOOD PRESSURE: 93 MMHG

## 2022-11-17 DIAGNOSIS — J01.00 ACUTE NON-RECURRENT MAXILLARY SINUSITIS: Primary | ICD-10-CM

## 2022-11-17 RX ORDER — FLUTICASONE PROPIONATE 50 MCG
2 SPRAY, SUSPENSION (ML) NASAL DAILY
Qty: 2 EACH | Refills: 1 | Status: SHIPPED | OUTPATIENT
Start: 2022-11-17 | End: 2022-12-17

## 2022-11-17 RX ORDER — FLUCONAZOLE 150 MG/1
150 TABLET ORAL DAILY
Qty: 1 TABLET | Refills: 0 | Status: SHIPPED | OUTPATIENT
Start: 2022-11-17 | End: 2022-11-18

## 2022-11-17 RX ORDER — AMOXICILLIN AND CLAVULANATE POTASSIUM 500; 125 MG/1; MG/1
1 TABLET, FILM COATED ORAL 2 TIMES DAILY
Qty: 20 TABLET | Refills: 0 | Status: SHIPPED | OUTPATIENT
Start: 2022-11-17 | End: 2022-11-27

## 2022-11-17 RX ORDER — ALBUTEROL SULFATE 90 UG/1
1 AEROSOL, METERED RESPIRATORY (INHALATION)
Qty: 18 G | Refills: 3 | Status: SHIPPED | OUTPATIENT
Start: 2022-11-17

## 2022-11-17 NOTE — PROGRESS NOTES
Alfredo Lizama ( 1970) is a 49 y.o. female, established patient, here for evaluation of the following chief complaint(s):  Cold Symptoms (X12 days; cough/runny nose/nasal congestion/sore throat/headache/sinus pressure and pain; home covid Friday - neg; taking tylenol cold and sinus)       ASSESSMENT/PLAN:  Diagnoses and all orders for this visit:    1. Acute non-recurrent maxillary sinusitis    Other orders  -     amoxicillin-clavulanate (AUGMENTIN) 500-125 mg per tablet; Take 1 Tablet by mouth two (2) times a day for 10 days. -     fluticasone propionate (FLONASE) 50 mcg/actuation nasal spray; 2 Sprays by Both Nostrils route daily for 30 days. -     albuterol (PROVENTIL HFA, VENTOLIN HFA, PROAIR HFA) 90 mcg/actuation inhaler; Take 1 Puff by inhalation every four (4) hours as needed for Wheezing.  -     fluconazole (DIFLUCAN) 150 mg tablet; Take 1 Tablet by mouth daily for 1 day. FDA advises cautious prescribing of oral fluconazole in pregnancy. No follow-ups on file. Cold Symptoms  The history is provided by the Patient. This is a new problem. The current episode started more than 1 week ago. The problem occurs constantly. The problem has been gradually worsening. The cough is Non-productive. There has been no fever. Associated symptoms include rhinorrhea. Pertinent negatives include no chest pain, no chills, no sweats, no weight loss, no eye redness, no ear congestion, no ear pain, no headaches, no myalgias, no shortness of breath, no wheezing, no nausea, no vomiting and no confusion. Subjective:   Pt is a 49 y.o. female     Review of Systems   Constitutional: Negative. Negative for chills, diaphoresis, fever, malaise/fatigue and weight loss. HENT:  Positive for rhinorrhea and sinus pain. Negative for ear pain. Eyes: Negative. Negative for redness. Respiratory:  Negative for shortness of breath and wheezing. Cardiovascular: Negative. Negative for chest pain. Gastrointestinal: Negative. Negative for nausea and vomiting. Musculoskeletal:  Negative for myalgias. Skin: Negative. Neurological: Negative. Negative for headaches. Endo/Heme/Allergies: Negative. Psychiatric/Behavioral: Negative. Negative for confusion. Past Medical History:   Diagnosis Date    Asthma     GERD (gastroesophageal reflux disease)     Ulcerative colitis     Ulcerative colitis ([facility] [address]        Past Surgical History:   Procedure Laterality Date    HX LIPOSUCTION      HX VASCULAR ACCESS      removed in 2008       No results found for this visit on 11/17/22. Objective:     Physical Exam  Vitals and nursing note reviewed. Constitutional:       Appearance: Normal appearance. She is normal weight. HENT:      Head: Normocephalic and atraumatic. Right Ear: Tympanic membrane normal.      Left Ear: Tympanic membrane normal.      Nose: Congestion and rhinorrhea present. Mouth/Throat:      Mouth: Mucous membranes are moist.   Eyes:      Extraocular Movements: Extraocular movements intact. Pupils: Pupils are equal, round, and reactive to light. Cardiovascular:      Rate and Rhythm: Normal rate and regular rhythm. Pulses: Normal pulses. Pulmonary:      Effort: Pulmonary effort is normal.      Breath sounds: Normal breath sounds. Musculoskeletal:         General: Normal range of motion. Cervical back: Normal range of motion and neck supple. Neurological:      General: No focal deficit present. Mental Status: She is alert and oriented to person, place, and time. Psychiatric:         Behavior: Behavior normal.               An electronic signature was used to authenticate this note.   -- Tobias Frost PA-C

## 2022-11-17 NOTE — PROGRESS NOTES
Identified pt with two pt identifiers(name and ). Reviewed record in preparation for visit and have obtained necessary documentation. Chief Complaint   Patient presents with    Cold Symptoms     X12 days; cough/runny nose/nasal congestion/sore throat/headache/sinus pressure and pain; home covid Friday - neg; taking tylenol cold and sinus      Vitals:    22 1229   BP: (!) 130/93   Pulse: 80   Resp: 16   Temp: 97.7 °F (36.5 °C)   TempSrc: Temporal   SpO2: 97%   Weight: 211 lb 9.6 oz (96 kg)   Height: 5' 6\" (1.676 m)   PainSc:   2   PainLoc: Head       Health Maintenance Review: Patient reminded of \"due or due soon\" health maintenance. I have asked the patient to contact his/her primary care provider (PCP) for follow-up on his/her health maintenance. Coordination of Care Questionnaire:  :   1) Have you been to an emergency room, urgent care, or hospitalized since your last visit? If yes, where when, and reason for visit? no       2. Have seen or consulted any other health care provider since your last visit? If yes, where when, and reason for visit? NO      Patient is accompanied by self I have received verbal consent from Cashayan Quesada to discuss any/all medical information while they are present in the room.

## 2023-01-13 ENCOUNTER — TELEPHONE (OUTPATIENT)
Dept: PHARMACY | Age: 53
End: 2023-01-13

## 2023-01-13 NOTE — TELEPHONE ENCOUNTER
Specialty Medication Service    Date: 1/13/2023  Patient's Name: Maciej Weldon YOB: 1970            _____________________________________________________________________________________________     Flex Fajardo patient's specialist office to request most recent office visit summary and relevant labs for Specialty Medication Service formulary medication.  Left message to have faxed to [phone]    Fany UlloaD [facility] [place]  Ambulatory Clinical Pharmacist  Specialty Medication Services  Phone: [phone] option #4  Fax: [phone]    For Pharmacy Admin Tracking Only    Program: SMS  CPA in place: Yes  Recommendation Provided To: Provider: 1 via Called provider office   Intervention Accepted By: Provider: 0  Time Spent (min): 10

## 2023-01-19 ENCOUNTER — VIRTUAL VISIT (OUTPATIENT)
Dept: PHARMACY | Age: 53
End: 2023-01-19

## 2023-01-19 ENCOUNTER — TELEPHONE (OUTPATIENT)
Dept: PHARMACY | Age: 53
End: 2023-01-19

## 2023-01-19 DIAGNOSIS — M06.09 RHEUMATOID ARTHRITIS OF MULTIPLE SITES WITH NEGATIVE RHEUMATOID FACTOR (HCC): Primary | ICD-10-CM

## 2023-01-19 NOTE — TELEPHONE ENCOUNTER
Specialty Medication Service    Date: 1/19/2023  Patient's Name: Kathe Euceda YOB: 1970            _____________________________________________________________________________________________    Sean Will to patient today  to reschedule missed SMS follow-up appointment on 01/14/23 for Specialty Medication Services. Please call: [phone] option 4. Will continue to outreach as appropriate. Patient would like to complete the visit today at 5pm. Will call patient accordingly.      Amie Wiggins, PharmD [facility]  Ambulatory Clinical Pharmacist  Specialty Medication Services  Phone: [phone] option #4  Fax: [phone]    For Pharmacy Admin Tracking Only    Program: SMS  CPA in place: Yes  Recommendation Provided To: Patient/Caregiver: 1 via Telephone  Intervention Detail: Scheduled Appointment  Intervention Accepted By: Patient/Caregiver: 1    Time Spent (min): 10

## 2023-01-19 NOTE — PROGRESS NOTES
Specialty Medication Service    Patient's Name: Jyoti Chaparro YOB: 1970        Reason for visit: Jyoti Chaparro is a 47 y.o. female presenting today for Specialty Medication Service visit follow up. Patient last seen by [facility] 10/27/2022. Patient continues on SMS formulary medication, Infliximab. Pharmacy completed Specialty Medication Service visit for medication monitoring and counseling. Medication list updated. Specialty Medication: Infliximab 5mg/kg  Frequency: every 6 weeks  Indication: Seronegative Rheumatoid Arthritis/ Ulcerative colitis  Initially Diagnosed: 2022 RA; 25 years ago UC  Additional Therapy:   Prednisone for flares  Tramadol as needed pain  Previous Therapy:   None     Specialist:   Robin Lara MD   [address]  Phone: [phone]  Specialist Progress Note Available:Yes, [employer] [facility] [place] [facility]  Last Specialist Visit: 12/05/2022:  Infusion + RA follow-up. Having minimal pain in upper and lower extremities, morning stiffness <15 min. No significant swelling in upper and lower extremities. Continues infusion every 6 weeks, no infections or injection site reactions, no impairment to functional status. Plan: Continue Remicade every 6 weeks, 5mg/kg, tramadol 50mg as needed. Follow-up 2 months. Allergies   Allergen Reactions    Nsaids (Non-Steroidal Anti-Inflammatory Drug) Other (comments)     Exacerbation of Ulcerative Colitis/history of ulcers    Plantain Itching     Hives and itching. Might have been due to pesticides on plantain in [place]. Sulfa (Sulfonamide Antibiotics) Other (comments)     nausea        Past Medical History:   Diagnosis Date    Asthma     GERD (gastroesophageal reflux disease)     Ulcerative colitis     Ulcerative colitis ([facility] [address]       Social History     Tobacco Use    Smoking status: Never    Smokeless tobacco: Never   Substance Use Topics    Alcohol use:  Yes Comment: social     No family history on file. INTERM HISTORY  Have you been diagnosed with any additional conditions since we last talked? No  Have you developed any new allergies since we last talked? No  Have you stopped taking any medications or supplements since we last talked? Yes, tramadol. No longer in pain. Have you started taking any additional medications or supplements since we last talked? No    REVIEW OF CURRENT DISEASE STATE  Rheumatoid Arthritis: Patient with diagnosis of rheumatoid arthritis being seen in regards to specialty medication use. Patient has been stable on Remicade for >10 years for ulcerative colitis, but this past January an insurance issue caused her to miss her infusions. She did not experience any symptoms of her Ulcerative colitis during this time, but reports in April she experienced an arthritis flare -  swelling in hands, wrists, elbows and knees. Patient could not make a fist. Feels like walking on a golf ball. Gained 12 pounds from swelling. Patient saw PCP and they took labs and determined she needed to be seen by rheumatologist. Patient was treated with steroids and diagnosed with rheumatoid arthritis. Her dose or Remicade was also switched from every 8 weeks to every 6 weeks as well. Initial SMS:  Patient reports currently all her arthritis symptoms have resolved, except for her right foot. Experiencing significant swelling that can make it hard to walk and pain is 4-5/10 daily. Patient is not taking anything for pain because previous ulcers prevent her from using NSAIDs and patient not comfortable using tramadol rheumatologist prescribed. Nor is patient interested in using tylenol arthritis. Patient is tough and will just work thru the pain and still running 5 days per week (~30 - 45 minutes). No other symptoms or complaints at this time. Patient can do all ADL and rapid 3 today shows moderate disease activity.      Current SMS: Patient states her arthritis symptoms are now fully in remission. States she started to feel this way about a week after we last spoke. Denies any pain or swelling. Foot no longer feels like walking on a golf ball, able to make a fist, no symptoms in hands, wrist, elbows or knees. No longer taking prednisone or tramadol. Running 5 days per week and on the elliptical 2 days per week. Planning a surfing trip to [facility] in June. Feels great, no restrictions on ADL or concerns at this time. Continuing to tolerate Infliximab well without any side effects. Does report had the flu around kia, followed by having covid for the third time on debi evans. This time only felty achy for four hours and returned to normal. Plans on getting shingles vaccine and PNA vaccine, but has been sick and also reports tried to get PNA vaccine at [place] and was told it wasn't covered. RAPID3 - Routine Assessment of Patient Index Data   Over the last week,  were you able to:   0: Without ANY difficulty  1: With SOME difficulty  2: With [facility] difficulty  3: UNABLE to do    Score   1. Dress yourself, including tying shoelaces and doing buttons? 0    Get in and out of bed? 0    Lift a full cup or glass to your mouth? 0    Walk outdoors on flat ground? 0    Wash and dry your entire body? 0    Bend down to  clothing from the floor? 0    Turn regular faucets on and off? 0    Get in and out of a car, bus, train, or airplane? 0    Walk two miles or three kilometers, if you wish? 0    Participate in recreational activities and sports as you would like, if you wish? 0    Get a good night's sleep? 0    Deal with feelings of anxiety or being nervous? 0    Deal with feelings of depression or feeling blue? 0   2. How much pain have you had because of your condition over the past week? (Scale 0 - 10) 0   3. Considering all the ways in which illness and health conditions may affect you at this time, how are you doing?  (Scale 0 - 10) 0   Final Score: 0 ; remission activity      Inflammatory Bowel Disease: Patient with diagnosis of ulcerative colitis for over 25 years. Disease has involved entire colon. The patient has had no surgery for treatment of their IBD. Has been on Remicade for >10 years. Prior, she at least 15 blood transfusions due to anemia. Patient reports her diease has been under control with Remicade the entire time (even during treatment lapse this year). No flares or concerns. Prior SMS: The patient is currently having 1 bowel movements per day which are semisolid. The patient currently denies significant abdominal pain or discomfort. .  The patient does not have fever. The patient does not have weight loss. No questions/concerns. Current SMS: Patient reports her UC continues to be stable for the last 20 years. 1 BM/day, fully formed, denies abdominal pain, blood/mucus in stools, vision changes. Colonoscopy planned was planned for January, but due to being covid positive it has been moved to March. Patient has no current concerns related to her UC. · Are you currently having a flare? no  · Considering all the ways in which this condition and others affects you, how are you doing (0 = very well, 10 = very poorly)? 0  · How would you rate your pain on average? (0 = no pain, 10 = worst pain imaginable) 0  · During the past 4 weeks, have you missed any planned activities of daily living due to condition (work/school/other plans)? No  · During the past 4 weeks, have you had to seek urgent care, ER admission, Unplanned doctor office visit, or hospital admission? Yes, covid    MEDICATIONS  Current Outpatient Medications   Medication Sig Dispense Refill    Flowflex COVID-19 Ag Home Test kit       albuterol (PROVENTIL HFA, VENTOLIN HFA, PROAIR HFA) 90 mcg/actuation inhaler Take 1 Puff by inhalation every four (4) hours as needed for Wheezing.  18 g 3    inFLIXimab (REMICADE) 100 mg injection Infuse 468 mg IV every 6 weeks 5 Each 3    sucralfate (CARAFATE) 1 gram tablet Take 1 g by mouth four (4) times daily. As needed for stomach ulcers      albuterol (PROVENTIL HFA, VENTOLIN HFA, PROAIR HFA) 90 mcg/actuation inhaler Take 2 Puffs by inhalation every four (4) hours as needed for Wheezing. 1 Inhaler 0    multivit with iron,minerals (MULTIVITAMIN AND MINERALS PO) Take 1 tablet by mouth daily. pantoprazole (PROTONIX) 40 mg tablet Take 40 mg by mouth daily. Current Specialty Medication:   Infliximab (Remicade/Renflexis/Inflectra)   Indication Specific Recommended dose:   Rheumatoid Arthritis (with methotrexate): 3 mg/kg at weeks 0, 2, and 6 followed by 3 mg/kg every 8 weeks; Patients with incomplete response, consider adjusting dose up to 10 mg/kg every 8 weeks or treating as often as every 4 weeks  Warnings/Precautions: Autoimmune disorders; Cardiovascular/cerebrovascular reactions during and following infusion; leukopenia, neutropenia, thrombocytopenia, pancytopenia; Hepatic reactions; Reactivation of hepatitis B virus; Hypersensitivity or infusion reactions; Infections: [US Boxed Warning]; Malignancy: [US Boxed Warning]; Tuberculosis: [US Boxed Warning]; Demyelinating CNS disease (initiation); worsening and new-onset HF has been reported; Recommended Monitoring: Vitals during infusion; signs/symptoms of infection; CBC with differential; LFTs; TB screening prior to initiating and during therapy; signs/symptoms/worsening of heart failure; signs and symptoms of malignancy (eg, splenomegaly, hepatomegaly, abdominal pain, persistent fever, night sweats, weight loss). Storage: Refrigerate at [facility] to [facility] (36ºF to 46ºF). Do not use REMICADE beyond the expiration date printed on carton and the vial. This product contains no preservative.   Handling: Must be administered by healthcare professional  Patient Reported Side Effects: none     Specialty Medication Start Date: 10 years  Appropriate Dose: Yes  Last tuberculin Test: 08/25/2022              - Result: Negative    Describe your medication adherence over the last 4 weeks: Excellent  How many doses have you missed in the last 4 weeks, if any? 0  How confident are you to follow the injection process and the treatment plan? (0-10) n/a Who injects? Infusion center  Does the patient have a current infection of any kind? No    Contraindications to therapy present? No    Drug Interactions: The following clinically significant interactions were identified via TxtFeedback Interaction Analysis as category D or higher: Multivitamin + Sucralfate: increased sucralfate concentrations. At last SMS visit, patient states wasn't aware to separate but also only using sucralfate as needed. Will start . Patient is .   _____________________________________________________________________  Renal Dosing:  Creatinine Clearance: C-G Adjusted Body Weight: 104.9 ml/min  No renal adjustments necessary.     LABS    Labs 12/05/2022 - [facility]  CBC w/ diff: All WNL  CMP: All WNL  ALT: 14, AST: 17, ALP: 91  CRP: 1.08 (H), trending down  ESR: 35 (H), trending down    Labs 08/19/2022 - [facility] Ext Chart Notes   CBC w/ diff: All WNL  CMP: All WNL, except  ALT 11, AST 11 (L), ALP 78  CRP: 1.18 ; 2.65 (5/17/22)  ESR: 41, 50 (5/17/22)  HepB Surf Ag: Negative  HepC Vir Ab: <0.1  Quantiferon: negative  Ra latex turbid <10  CCP Ab: 5    Lab Results   Component Value Date/Time    BUN 11 08/02/2021 05:33 AM     Lab Results   Component Value Date/Time    WBC 9.9 08/02/2021 05:33 AM    HGB 10.9 (L) 08/02/2021 05:33 AM    HCT 34.0 (L) 08/02/2021 05:33 AM    RBC 4.18 08/02/2021 05:33 AM    PLATELET 465 88/56/1551 05:33 AM     Lab Results   Component Value Date/Time    ALT (SGPT) 26 08/02/2021 05:33 AM    AST (SGOT) 13 (L) 08/02/2021 05:33 AM       IMMUNIZATIONS  Immunization History   Administered Date(s) Administered    Influenza Vaccine 01/01/2018, 11/16/2021      Immunization status: up to date and documented, missing doses of Flu (patient received), [id number], Shingrix    ASSESSMENTS  No flowsheet data found. No flowsheet data found. Rheumatoid Arthritis  Rafael Mo is a 47 y.o. female being treated for Rheumatoid Arthritis  Medication reconciliation completed (Patient provided), no new drug-drug interactions identified. Allergy and diagnosis info reviewed and updated. Rafael Mo has a history remarkable for the following conditions: ulcerative colitis, ulcers  Current therapy includes: Remicade 5mg/kg every 6 weeks. Medication Effectiveness: Patient disease is well controlled on current therapy. Reports zero pain, Rapid 3 today states remission and CRP and ESR are trending downwards. Patient had no questions regarding the medication's warnings, precautions, and contraindications. Confirmed appropriate storage and disposal.  Patient had no concerns with the administration process. Current disease state symptoms include: Patient states no symptoms, pain is 0/10. No side effects/adverse events reported, and no adherence issues identified. Functional and cognitive limitations include: None, patient is running 5 days per week and elliptical 2 days per week. Patient is not considered high risk. Based on patient feedback/results of the assessment, the therapy is still appropriate. No medication-related problem(s) or patient need(s) identified that would require a care plan. Follow up in 180 days     2. Ulcerative Colitis  Rafael Mo is a 47 y.o. female being treated for Ulcerative Colitis  Medication reconciliation completed (Patient provided), no new drug-drug interactions identified. Allergy and diagnosis info reviewed and updated. Rafael Mo has a history remarkable for the following conditions: rheumatoid arthritis, ulcers  Current therapy includes: Remicade 5mg/kg every 6 weeks. Medication Effectiveness: Patient disease is well controlled on current therapy, per symptom assessment. Patient denies flares and reports being stable for 20 years. Has a colonoscopy scheduled 3/2023. Patient had no questions regarding the medication's warnings, precautions, and contraindications. Confirmed appropriate storage and disposal.  Patient had no concerns with the administration process. Current disease state symptoms include: None, 1 bowel movement per day that is formed, denies any other symptoms. No side effects/adverse events reported, and no adherence issues identified. Functional and cognitive limitations include: none  Patient is not considered high risk. Based on patient feedback/results of the assessment, the therapy is still appropriate. No medication-related problem(s) or patient need(s) identified that would require a care plan. Follow up in 180 days     3. Immunization  Immunization status: up to date and documented, missing doses of [id number] and Shingrix  [id number]: States went to [facility] to obtain and was told it was not covered by her insurance and has to go to [facility]. Informed patient the vaccine should have been covered and I will check with our benefits team to find out. Email benefits team today and follow-up with patient accordingly. Shingrix: Patient states still plans on obtaining and was waiting to get over Covid and Flu. Has not questions or concerns at this time. Follow-up next SMS. 4. Drug Interaction  The following clinically significant interactions were identified via Lexicomp Interaction Analysis as category D or higher: Multivitamin + Sucralfate: increased sucralfate concentrations. At last SMS visit, patient states wasn't aware to separate but also only using sucralfate as needed. Patient reports  and very rare she needs sucralfate. No further action needed at this time. PLAN  Goals of therapy, common side effects, medication storage, and administration reviewed with patient.   Continue Infliximab 5mg/kg every 6 weeks as prescribed Recommended monitoring to complete: None at this time  Recommended vaccinations: Prevnar, Shingrix  Keep all scheduled appointments. Kevin Klein, who was evaluated through a synchronous (real-time) audio only encounter, and/or her healthcare decision maker, is aware that it is a billable service, which includes applicable co-pays, with coverage as determined by her insurance carrier. She provided verbal consent to proceed and patient identification was verified. This visit was conducted pursuant to the emergency declaration under the [address] [place] [address], [address] [place] [address] authority and the [employer] General Act. A caregiver was present when appropriate. Ability to conduct physical exam was limited.  The patient was located at: Home: [address] [place] [id number]  The provider was located at: Home: [place]    --Jose Martinez, Lucinda Whalen on 1/19/2023 at 5:41 PM      Jose Martinez PharmD [facility]  Ambulatory Clinical Pharmacist  Specialty Medication Services  Phone: [phone] option #4  Fax: [phone]    For Pharmacy Admin Tracking Only    Program: Anpro21  CPA in place: Yes  Recommendation Provided To: Patient/Caregiver: 2 via Virtual Visit  Intervention Detail: Scheduled Appointment and Vaccine Recommended/Administered  Intervention Accepted By: Patient/Caregiver: 2  Time Spent (min):  90

## 2023-05-24 ENCOUNTER — NURSE TRIAGE (OUTPATIENT)
Dept: OTHER | Facility: CLINIC | Age: 53
End: 2023-05-24

## 2023-05-24 ENCOUNTER — APPOINTMENT (OUTPATIENT)
Facility: HOSPITAL | Age: 53
End: 2023-05-24
Payer: COMMERCIAL

## 2023-05-24 ENCOUNTER — HOSPITAL ENCOUNTER (EMERGENCY)
Facility: HOSPITAL | Age: 53
Discharge: HOME | End: 2023-05-24
Attending: EMERGENCY MEDICINE
Payer: COMMERCIAL

## 2023-05-24 VITALS
RESPIRATION RATE: 14 BRPM | TEMPERATURE: 99.1 F | BODY MASS INDEX: 33.48 KG/M2 | SYSTOLIC BLOOD PRESSURE: 125 MMHG | WEIGHT: 208.34 LBS | HEIGHT: 66 IN | HEART RATE: 83 BPM | OXYGEN SATURATION: 93 % | DIASTOLIC BLOOD PRESSURE: 85 MMHG
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
## 2023-05-24 DIAGNOSIS — S76.312A STRAIN OF LEFT HAMSTRING MUSCLE, INITIAL ENCOUNTER: Primary | ICD-10-CM

## 2023-05-24 PROCEDURE — 99283 EMERGENCY DEPT VISIT LOW MDM: CPT | Performed by: EMERGENCY MEDICINE

## 2023-05-24 PROCEDURE — 73562 X-RAY EXAM OF KNEE 3: CPT

## 2023-05-24 RX ORDER — TRAMADOL HYDROCHLORIDE 50 MG/1
TABLET ORAL
COMMUNITY
Start: 2023-05-15

## 2023-05-24 RX ORDER — METHOCARBAMOL 750 MG/1
750 TABLET, FILM COATED ORAL 3 TIMES DAILY PRN
Qty: 30 TABLET | Refills: 0 | Status: SHIPPED | OUTPATIENT
Start: 2023-05-24 | End: 2023-06-03

## 2023-05-24 RX ORDER — LIDOCAINE 50 MG/G
1 PATCH TOPICAL DAILY
Qty: 30 PATCH | Refills: 0 | Status: SHIPPED | OUTPATIENT
Start: 2023-05-24 | End: 2023-06-23

## 2023-05-24 ASSESSMENT — PAIN DESCRIPTION - ORIENTATION: ORIENTATION: LEFT

## 2023-05-24 ASSESSMENT — PAIN - FUNCTIONAL ASSESSMENT: PAIN_FUNCTIONAL_ASSESSMENT: 0-10

## 2023-05-24 ASSESSMENT — PAIN DESCRIPTION - PAIN TYPE: TYPE: ACUTE PAIN

## 2023-05-24 ASSESSMENT — PAIN SCALES - GENERAL: PAINLEVEL_OUTOF10: 6

## 2023-05-24 ASSESSMENT — PAIN DESCRIPTION - LOCATION: LOCATION: LEG

## 2023-05-24 NOTE — TELEPHONE ENCOUNTER
[facility]    Subjective: Caller states \"2-3 weeks ago and started limping, wrapped and placed ice. Went ortho on call last week thinks hamstring was pulled at insertion site. 30 minutes ago Was walking and fell down stairs, hit head, left knee is one fire. \"     Current Symptoms: Left knee pain and swelling to the back - half of a small lemon. Unable to bear weight. Onset: 30 minutes ago; sudden    Associated Symptoms: NA    Pain Severity: 6/10; throbbing; constant, intermittent 10/10 when walking. Temperature: denies  by unknown method    What has been tried: Ice and two advil     LMP:  Cycle ended for one year and started again in march  Pregnant: No    Recommended disposition: Go to ED/UCC Now (Or to Office with PCP Approval)    Care advice provided, patient verbalizes understanding; denies any other questions or concerns; instructed to call back for any new or worsening symptoms. Patient/caller agrees to proceed to Free standing [facility]  Emergency Department    Attention Provider: Thank you for allowing me to participate in the care of your patient. The patient was connected to triage in response to symptoms provided. Please do not respond through this encounter as the response is not directed to a shared pool.       Reason for Disposition   Can't stand (bear weight) or walk    Protocols used: Knee Injury-ADULT-AH

## 2023-05-24 NOTE — ED TRIAGE NOTES
Pt reports her leg have out and fell down 3 steps at around 19:00. Reporting pain in the Lt hamstring area and Lt knee, unable to bare weight. Hit her head but denies any pain or LOC. Not on blood thinners. Reports taking motrin at around 1900.

## 2023-05-25 ENCOUNTER — CARE COORDINATION (OUTPATIENT)
Dept: OTHER | Facility: CLINIC | Age: 53
End: 2023-05-25

## 2023-05-25 NOTE — ED PROVIDER NOTES
[facility] EMERGENCY CTR  EMERGENCY DEPARTMENT ENCOUNTER      Pt Name: Eda Arias  MRN: [id number]  [place]fcliff 1970  Date of evaluation: 5/24/2023  Provider: Neelam Ortega DO      HISTORY OF PRESENT ILLNESS      67-year-old female presents to the emergency department stating that a couple of weeks ago she went into orthopedics to be evaluated for left knee pain and was diagnosed with a hamstring strain. This evening shortly prior to arrival she accidentally lost her balance and fell down 3 steps at about 1900. She states that in this fall she significantly aggravated her left leg pain noting increased pain in her left hamstring area and her left knee. She denies any history of prior knee fractures or surgeries. She states that since her fall she has had difficulty bearing weight on her leg and expresses concern for possible fracture. She states that she lightly bumped her head on cardboard boxes in her fall but denies any LOC no amnesia of the event, on no blood thinners. Denies any other significant injuries only concerned about her knee. She took a dose of Motrin prior to arrival and states that her pain is slightly better now. Nursing Notes were reviewed.     REVIEW OF SYSTEMS         Review of Systems        PAST MEDICAL HISTORY     Past Medical History:   Diagnosis Date    Asthma     GERD (gastroesophageal reflux disease)     Ulcerative colitis ([address]     Ulcerative colitis ([facility] [address]          SURGICAL HISTORY       Past Surgical History:   Procedure Laterality Date    LIPOSUCTION      VASCULAR SURGERY      removed in 2008         CURRENT MEDICATIONS       Previous Medications    ALBUTEROL SULFATE HFA (PROVENTIL;VENTOLIN;PROAIR) 108 (90 BASE) MCG/ACT INHALER    Inhale 1 puff into the lungs every 4 hours as needed    INFLIXIMAB (REMICADE) 100 MG INJECTION    Infuse 468 mg IV every 6 weeks    MULTIPLE VITAMINS-MINERALS (MULTIVITAMIN ADULTS PO)    multivitamin    PANTOPRAZOLE (PROTONIX) 40

## 2023-05-25 NOTE — CARE COORDINATION
Ambulatory Care Coordination Note    ACM attempted to reach patient for Associate Care Management related to ED visit. No voicemail available, will continue to outreach.      Plan for follow-up call in 3-5 days    Future Appointments   Date Time Provider Salas Singleton   7/19/2023 12:00 PM Heath Mead 6 [facility]

## 2023-05-30 ENCOUNTER — CARE COORDINATION (OUTPATIENT)
Dept: OTHER | Facility: CLINIC | Age: 53
End: 2023-05-30

## 2023-05-30 NOTE — CARE COORDINATION
Ambulatory Care Coordination Note    ACM attempted 2nd outreach to patient for introduction to Associate Care Management related to ED visit. HIPAA compliant message left requesting a return phone call at patient convenience. Unable to Reach Letter sent to patient via [employer]. Will continue to outreach.       Future Appointments   Date Time Provider Salsa Singleton   7/19/2023 12:00 PM Ryland Blizzard, [place] [address] [facility]

## 2023-06-07 RX ORDER — INFLIXIMAB 100 MG/10ML
INJECTION, POWDER, LYOPHILIZED, FOR SOLUTION INTRAVENOUS
Qty: 5 EACH | Refills: 3 | Status: ACTIVE | OUTPATIENT
Start: 2023-06-07

## 2023-06-20 ENCOUNTER — CARE COORDINATION (OUTPATIENT)
Dept: OTHER | Facility: CLINIC | Age: 53
End: 2023-06-20

## 2023-06-20 NOTE — CARE COORDINATION
Ambulatory Care Coordination Note    ACM attempted third and final call to patient for introduction to Associate Care Management. HIPAA compliant message left requesting a return phone call at patient convenience. Final Unable to Reach Letter sent to patient via [employer]. Pt replied to [employer] message on 5/30/2023 but then never would engage by calling or messaging further. No further outreach scheduled with this ACM, patient has been provided with this ACM's contact information. ACM will sign off care team at this time.      Future Appointments   Date Time Provider Salas Singleton   7/6/2023  9:30 AM [facility]IC [place] PAT EXAM RM 5 [facility]   7/19/2023 12:00 PM [place] [facility] [place] [facility]

## 2023-07-06 ENCOUNTER — HOSPITAL ENCOUNTER (OUTPATIENT)
Facility: HOSPITAL | Age: 53
Discharge: HOME | End: 2023-07-06
Payer: COMMERCIAL

## 2023-07-06 VITALS
TEMPERATURE: 92.8 F | HEIGHT: 66 IN | BODY MASS INDEX: 32.88 KG/M2 | HEART RATE: 74 BPM | WEIGHT: 204.59 LBS | SYSTOLIC BLOOD PRESSURE: 135 MMHG | DIASTOLIC BLOOD PRESSURE: 85 MMHG

## 2023-07-06 LAB
BASOPHILS # BLD: 0.1 K/UL (ref 0–0.1)
BASOPHILS NFR BLD: 1 % (ref 0–1)
DIFFERENTIAL METHOD BLD: ABNORMAL
EOSINOPHIL # BLD: 0.5 K/UL (ref 0–0.4)
EOSINOPHIL NFR BLD: 7 % (ref 0–7)
ERYTHROCYTE [DISTWIDTH] IN BLOOD BY AUTOMATED COUNT: 13.5 % (ref 11.5–14.5)
HCT VFR BLD AUTO: 41.4 % (ref 35–47)
HGB BLD-MCNC: 12.7 G/DL (ref 11.5–16)
IMM GRANULOCYTES # BLD AUTO: 0 K/UL (ref 0–0.04)
IMM GRANULOCYTES NFR BLD AUTO: 0 % (ref 0–0.5)
LYMPHOCYTES # BLD: 2.5 K/UL (ref 0.8–3.5)
LYMPHOCYTES NFR BLD: 32 % (ref 12–49)
MCH RBC QN AUTO: 26.2 PG (ref 26–34)
MCHC RBC AUTO-ENTMCNC: 30.7 G/DL (ref 30–36.5)
MCV RBC AUTO: 85.4 FL (ref 80–99)
MONOCYTES # BLD: 0.5 K/UL (ref 0–1)
MONOCYTES NFR BLD: 7 % (ref 5–13)
NEUTS SEG # BLD: 4.1 K/UL (ref 1.8–8)
NEUTS SEG NFR BLD: 53 % (ref 32–75)
NRBC # BLD: 0 K/UL (ref 0–0.01)
NRBC BLD-RTO: 0 PER 100 WBC
PLATELET # BLD AUTO: 337 K/UL (ref 150–400)
PMV BLD AUTO: 11.7 FL (ref 8.9–12.9)
RBC # BLD AUTO: 4.85 M/UL (ref 3.8–5.2)
WBC # BLD AUTO: 7.7 K/UL (ref 3.6–11)

## 2023-07-06 PROCEDURE — 85025 COMPLETE CBC W/AUTO DIFF WBC: CPT

## 2023-07-06 PROCEDURE — 36415 COLL VENOUS BLD VENIPUNCTURE: CPT

## 2023-07-06 NOTE — PERIOP NOTE
change, (i.e. fever, cold, flu, etc.) please notify your surgeon as soon as possible. It is important to be on time. If a situation occurs where you may be delayed, please call:  [phone] / [phone] on the day of surgery. The Preadmission Testing staff can be reached at [phone]. Current Outpatient Medications   Medication Sig    inFLIXimab (REMICADE) 100 MG injection INFUSE 468 MG IV EVERY 6 WEEKS    traMADol (ULTRAM) 50 MG tablet     Multiple Vitamins-Minerals (MULTIVITAMIN ADULTS PO) multivitamin    albuterol sulfate HFA (PROVENTIL;VENTOLIN;PROAIR) 108 (90 Base) MCG/ACT inhaler Inhale 1 puff into the lungs every 4 hours as needed    pantoprazole (PROTONIX) 40 MG tablet Take 1 tablet by mouth every morning    sucralfate (CARAFATE) 1 GM tablet Take 1 tablet by mouth 4 times daily as needed     No current facility-administered medications for this encounter. YOU MUST ONLY TAKE THESE MEDICATIONS THE MORNING OF SURGERY WITH A SIP OF WATER: PROTONIX    MEDICATIONS TO TAKE THE MORNING OF SURGERY ONLY IF NEEDED: ALBUTEROL, TRAMADOL    Stop all vitamins, herbal medicines and Aspirin containing products 7 days prior to surgery. Stop any non-steroidal anti-inflammatory drugs (i.e. Ibuprofen, Naproxen, Advil, Aleve) 3 days before surgery. You may take Tylenol. Preventing Infections Before and After - Your Surgery    IMPORTANT INSTRUCTIONS      You play an important role in your health and preparation for surgery. To reduce the germs on your skin you will need to shower with CHG soap (Chorhexidine gluconate 4%) two times before surgery. CHG soap (Hibiclens, Hex-A-Clens or store brand)  CHG soap will be provided at your Preadmission Testing (PAT) appointment. If you do not have a PAT appointment before surgery, you may arrange to  CHG soap from our office or purchase CHG soap at a pharmacy, grocery or department store.   You need to purchase TWO 4 ounce bottles to use for

## 2023-07-11 NOTE — H&P
palpable. Female Genitalia: External: no masses or erythema. Bladder/Urethra: normal meatus, no urethral discharge, and negative empty supine cough stress test. Vagina: no atrophy, tenderness, erythema, or abnormal vaginal discharge. Cervix: grossly normal. Uterus: midline, mobile, and normal size. Adnexa/Parametria: no adnexal tenderness. Pop-Q: Aa: -1, Ba: -1, C: +1, D: -3, Ap: -1, Bp: -1, genital hiatus [facility]): 4.0, perineal body (pb): 2.0, and total vaginal length (TVL): 7.0. Pelvic Floor: right levator ani normal, left levator ani normal, and kegel strength Oxford grade: 3/5. Neurologic: Gait and Station: normal gait. Cranial Nerves: grossly intact. Reflexes: normal bulbocavernosus reflex and anal wink reflex. Skin: Inspection and palpation: no rash or lesions. Assessment / Plan  1. Prolapse of female genital organs -  Stage 2 APICAL with long 3-4cm cervix  has become more and more bothered over past 6 years  Ulcerative colitis on q6 week infusions remicaide (timed appropriately for upcoming surgery, Dr. Sergei Chamberlain is her rheumatologist and has provided clearance)  Lyme disease at age 22 - left right eye blind/was intubated  PSH: BTL  TVUS: [id number], ovaries not visualized    UDS with SALOMON adn she experience SALOMON    No intra-abdominal mesh due to ulcerative colitis (Dr Janice Avalos, under control)    Risks of surgery including bleeding, need for blood transfusion, infection, damage to nearby organs, voiding dysfunction, TVT mesh extrusion and risk of recurrence reviewed. Details of procedures and postoperative recovery plan including limitations on activities and lifting reviewed. Patient agrees to proceed with  robotic USLS, TLH, cystoscopy, midurethral sling, POSSIBLE posterior repair (*will do minimal necessary to avoid dyspareunia with her )  N81.9: Female genital prolapse, unspecified    2.  Female stress incontinence -  ONLY with running  seen on POP reduced UDS  adding sling        Update History

## 2023-07-13 ENCOUNTER — HOSPITAL ENCOUNTER (OUTPATIENT)
Facility: HOSPITAL | Age: 53
Setting detail: OBSERVATION
Discharge: HOME | End: 2023-07-14
Attending: OBSTETRICS & GYNECOLOGY | Admitting: OBSTETRICS & GYNECOLOGY
Payer: COMMERCIAL

## 2023-07-13 ENCOUNTER — ANESTHESIA (OUTPATIENT)
Facility: HOSPITAL | Age: 53
End: 2023-07-13
Payer: COMMERCIAL

## 2023-07-13 ENCOUNTER — ANESTHESIA EVENT (OUTPATIENT)
Facility: HOSPITAL | Age: 53
End: 2023-07-13
Payer: COMMERCIAL

## 2023-07-13 DIAGNOSIS — N81.2 INCOMPLETE UTEROVAGINAL PROLAPSE: Primary | ICD-10-CM

## 2023-07-13 PROBLEM — N39.3 SUI (STRESS URINARY INCONTINENCE, FEMALE): Status: ACTIVE | Noted: 2023-07-13

## 2023-07-13 PROBLEM — N81.10 PELVIC ORGAN PROLAPSE QUANTIFICATION STAGE 2 CYSTOCELE: Status: ACTIVE | Noted: 2023-07-13

## 2023-07-13 PROBLEM — Z48.89 OBSERVATION AFTER SURGERY: Status: ACTIVE | Noted: 2023-07-13

## 2023-07-13 LAB
HBV SURFACE AG SER QL: <0.1 INDEX
HBV SURFACE AG SER QL: NORMAL
HCG UR QL: NEGATIVE
HIV1 P24 AG SERPL QL IA: NONREACTIVE
HIV1+2 AB SERPL QL IA: NONREACTIVE

## 2023-07-13 PROCEDURE — 3700000000 HC ANESTHESIA ATTENDED CARE: Performed by: OBSTETRICS & GYNECOLOGY

## 2023-07-13 PROCEDURE — 2580000003 HC RX 258

## 2023-07-13 PROCEDURE — G0378 HOSPITAL OBSERVATION PER HR: HCPCS

## 2023-07-13 PROCEDURE — 6360000002 HC RX W HCPCS

## 2023-07-13 PROCEDURE — 2580000003 HC RX 258: Performed by: OBSTETRICS & GYNECOLOGY

## 2023-07-13 PROCEDURE — 7100000000 HC PACU RECOVERY - FIRST 15 MIN: Performed by: OBSTETRICS & GYNECOLOGY

## 2023-07-13 PROCEDURE — 88305 TISSUE EXAM BY PATHOLOGIST: CPT

## 2023-07-13 PROCEDURE — 6360000002 HC RX W HCPCS: Performed by: OBSTETRICS & GYNECOLOGY

## 2023-07-13 PROCEDURE — 6360000002 HC RX W HCPCS: Performed by: ANESTHESIOLOGY

## 2023-07-13 PROCEDURE — 6370000000 HC RX 637 (ALT 250 FOR IP): Performed by: OBSTETRICS & GYNECOLOGY

## 2023-07-13 PROCEDURE — 2709999900 HC NON-CHARGEABLE SUPPLY: Performed by: OBSTETRICS & GYNECOLOGY

## 2023-07-13 PROCEDURE — 81025 URINE PREGNANCY TEST: CPT

## 2023-07-13 PROCEDURE — 3600000019 HC SURGERY ROBOT ADDTL 15MIN: Performed by: OBSTETRICS & GYNECOLOGY

## 2023-07-13 PROCEDURE — 7100000001 HC PACU RECOVERY - ADDTL 15 MIN: Performed by: OBSTETRICS & GYNECOLOGY

## 2023-07-13 PROCEDURE — 3600000009 HC SURGERY ROBOT BASE: Performed by: OBSTETRICS & GYNECOLOGY

## 2023-07-13 PROCEDURE — S2900 ROBOTIC SURGICAL SYSTEM: HCPCS | Performed by: OBSTETRICS & GYNECOLOGY

## 2023-07-13 PROCEDURE — 2500000003 HC RX 250 WO HCPCS

## 2023-07-13 PROCEDURE — 6370000000 HC RX 637 (ALT 250 FOR IP)

## 2023-07-13 PROCEDURE — 88307 TISSUE EXAM BY PATHOLOGIST: CPT

## 2023-07-13 PROCEDURE — 2500000003 HC RX 250 WO HCPCS: Performed by: OBSTETRICS & GYNECOLOGY

## 2023-07-13 PROCEDURE — C1771 REP DEV, URINARY, W/SLING: HCPCS | Performed by: OBSTETRICS & GYNECOLOGY

## 2023-07-13 PROCEDURE — 36415 COLL VENOUS BLD VENIPUNCTURE: CPT

## 2023-07-13 PROCEDURE — 3700000001 HC ADD 15 MINUTES (ANESTHESIA): Performed by: OBSTETRICS & GYNECOLOGY

## 2023-07-13 DEVICE — SLING INCONT RETROPUBIC CONTINENCE SYS GYNECARE TVT EXACT: Type: IMPLANTABLE DEVICE | Site: URETHRA | Status: FUNCTIONAL

## 2023-07-13 RX ORDER — SODIUM CHLORIDE 9 MG/ML
INJECTION, SOLUTION INTRAVENOUS PRN
Status: DISCONTINUED | OUTPATIENT
Start: 2023-07-13 | End: 2023-07-14 | Stop reason: HOSPADM

## 2023-07-13 RX ORDER — METRONIDAZOLE 500 MG/100ML
500 INJECTION, SOLUTION INTRAVENOUS
Status: COMPLETED | OUTPATIENT
Start: 2023-07-13 | End: 2023-07-14

## 2023-07-13 RX ORDER — SODIUM CHLORIDE 0.9 % (FLUSH) 0.9 %
5-40 SYRINGE (ML) INJECTION EVERY 12 HOURS SCHEDULED
Status: DISCONTINUED | OUTPATIENT
Start: 2023-07-13 | End: 2023-07-13 | Stop reason: HOSPADM

## 2023-07-13 RX ORDER — SUCRALFATE 1 G/1
1 TABLET ORAL 4 TIMES DAILY PRN
Status: DISCONTINUED | OUTPATIENT
Start: 2023-07-13 | End: 2023-07-14 | Stop reason: HOSPADM

## 2023-07-13 RX ORDER — SODIUM CHLORIDE 9 MG/ML
INJECTION, SOLUTION INTRAVENOUS PRN
Status: DISCONTINUED | OUTPATIENT
Start: 2023-07-13 | End: 2023-07-13 | Stop reason: HOSPADM

## 2023-07-13 RX ORDER — ONDANSETRON 2 MG/ML
4 INJECTION INTRAMUSCULAR; INTRAVENOUS AS NEEDED
Status: DISCONTINUED | OUTPATIENT
Start: 2023-07-13 | End: 2023-07-13 | Stop reason: HOSPADM

## 2023-07-13 RX ORDER — SCOLOPAMINE TRANSDERMAL SYSTEM 1 MG/1
PATCH, EXTENDED RELEASE TRANSDERMAL PRN
Status: DISCONTINUED | OUTPATIENT
Start: 2023-07-13 | End: 2023-07-13 | Stop reason: SDUPTHER

## 2023-07-13 RX ORDER — ACETAMINOPHEN 325 MG/1
650 TABLET ORAL EVERY 4 HOURS PRN
Status: DISCONTINUED | OUTPATIENT
Start: 2023-07-13 | End: 2023-07-14 | Stop reason: HOSPADM

## 2023-07-13 RX ORDER — KETAMINE HCL IN NACL, ISO-OSM 100MG/10ML
SYRINGE (ML) INJECTION PRN
Status: DISCONTINUED | OUTPATIENT
Start: 2023-07-13 | End: 2023-07-13 | Stop reason: SDUPTHER

## 2023-07-13 RX ORDER — MIDAZOLAM HYDROCHLORIDE 2 MG/2ML
2 INJECTION, SOLUTION INTRAMUSCULAR; INTRAVENOUS
Status: DISCONTINUED | OUTPATIENT
Start: 2023-07-13 | End: 2023-07-13 | Stop reason: HOSPADM

## 2023-07-13 RX ORDER — DOCUSATE SODIUM 100 MG/1
100 CAPSULE, LIQUID FILLED ORAL 2 TIMES DAILY
Status: DISCONTINUED | OUTPATIENT
Start: 2023-07-13 | End: 2023-07-14 | Stop reason: HOSPADM

## 2023-07-13 RX ORDER — SODIUM CHLORIDE 0.9 % (FLUSH) 0.9 %
5-40 SYRINGE (ML) INJECTION PRN
Status: DISCONTINUED | OUTPATIENT
Start: 2023-07-13 | End: 2023-07-14 | Stop reason: HOSPADM

## 2023-07-13 RX ORDER — PROCHLORPERAZINE EDISYLATE 5 MG/ML
5 INJECTION INTRAMUSCULAR; INTRAVENOUS
Status: DISCONTINUED | OUTPATIENT
Start: 2023-07-13 | End: 2023-07-13 | Stop reason: HOSPADM

## 2023-07-13 RX ORDER — HYDROMORPHONE HYDROCHLORIDE 1 MG/ML
0.5 INJECTION, SOLUTION INTRAMUSCULAR; INTRAVENOUS; SUBCUTANEOUS EVERY 5 MIN PRN
Status: DISCONTINUED | OUTPATIENT
Start: 2023-07-13 | End: 2023-07-13 | Stop reason: HOSPADM

## 2023-07-13 RX ORDER — PHENYLEPHRINE HCL IN 0.9% NACL 0.4MG/10ML
SYRINGE (ML) INTRAVENOUS PRN
Status: DISCONTINUED | OUTPATIENT
Start: 2023-07-13 | End: 2023-07-13 | Stop reason: SDUPTHER

## 2023-07-13 RX ORDER — ROCURONIUM BROMIDE 10 MG/ML
INJECTION, SOLUTION INTRAVENOUS PRN
Status: DISCONTINUED | OUTPATIENT
Start: 2023-07-13 | End: 2023-07-13 | Stop reason: SDUPTHER

## 2023-07-13 RX ORDER — NEOSTIGMINE METHYLSULFATE 1 MG/ML
INJECTION, SOLUTION INTRAVENOUS PRN
Status: DISCONTINUED | OUTPATIENT
Start: 2023-07-13 | End: 2023-07-13 | Stop reason: SDUPTHER

## 2023-07-13 RX ORDER — OXYCODONE HYDROCHLORIDE 5 MG/1
5 TABLET ORAL
Status: DISCONTINUED | OUTPATIENT
Start: 2023-07-13 | End: 2023-07-13 | Stop reason: HOSPADM

## 2023-07-13 RX ORDER — SODIUM CHLORIDE 0.9 % (FLUSH) 0.9 %
5-40 SYRINGE (ML) INJECTION EVERY 12 HOURS SCHEDULED
Status: DISCONTINUED | OUTPATIENT
Start: 2023-07-13 | End: 2023-07-14 | Stop reason: HOSPADM

## 2023-07-13 RX ORDER — PHENAZOPYRIDINE HYDROCHLORIDE 100 MG/1
200 TABLET, FILM COATED ORAL ONCE
Status: COMPLETED | OUTPATIENT
Start: 2023-07-13 | End: 2023-07-13

## 2023-07-13 RX ORDER — EPHEDRINE SULFATE 50 MG/ML
INJECTION INTRAVENOUS PRN
Status: DISCONTINUED | OUTPATIENT
Start: 2023-07-13 | End: 2023-07-13 | Stop reason: SDUPTHER

## 2023-07-13 RX ORDER — PROPOFOL 10 MG/ML
INJECTION, EMULSION INTRAVENOUS PRN
Status: DISCONTINUED | OUTPATIENT
Start: 2023-07-13 | End: 2023-07-13 | Stop reason: SDUPTHER

## 2023-07-13 RX ORDER — ALBUTEROL SULFATE 90 UG/1
1 AEROSOL, METERED RESPIRATORY (INHALATION) EVERY 4 HOURS PRN
Status: DISCONTINUED | OUTPATIENT
Start: 2023-07-13 | End: 2023-07-13 | Stop reason: CLARIF

## 2023-07-13 RX ORDER — ONDANSETRON 2 MG/ML
4 INJECTION INTRAMUSCULAR; INTRAVENOUS
Status: DISCONTINUED | OUTPATIENT
Start: 2023-07-13 | End: 2023-07-13 | Stop reason: HOSPADM

## 2023-07-13 RX ORDER — SODIUM CHLORIDE, SODIUM LACTATE, POTASSIUM CHLORIDE, CALCIUM CHLORIDE 600; 310; 30; 20 MG/100ML; MG/100ML; MG/100ML; MG/100ML
INJECTION, SOLUTION INTRAVENOUS CONTINUOUS
Status: DISCONTINUED | OUTPATIENT
Start: 2023-07-13 | End: 2023-07-13

## 2023-07-13 RX ORDER — OXYCODONE HYDROCHLORIDE AND ACETAMINOPHEN 5; 325 MG/1; MG/1
2 TABLET ORAL EVERY 6 HOURS PRN
Status: DISCONTINUED | OUTPATIENT
Start: 2023-07-13 | End: 2023-07-14 | Stop reason: HOSPADM

## 2023-07-13 RX ORDER — ONDANSETRON 2 MG/ML
4 INJECTION INTRAMUSCULAR; INTRAVENOUS EVERY 6 HOURS PRN
Status: DISCONTINUED | OUTPATIENT
Start: 2023-07-13 | End: 2023-07-14 | Stop reason: HOSPADM

## 2023-07-13 RX ORDER — GLYCOPYRROLATE 0.2 MG/ML
INJECTION, SOLUTION INTRAMUSCULAR; INTRAVENOUS PRN
Status: DISCONTINUED | OUTPATIENT
Start: 2023-07-13 | End: 2023-07-13 | Stop reason: SDUPTHER

## 2023-07-13 RX ORDER — LIDOCAINE HYDROCHLORIDE 20 MG/ML
INJECTION, SOLUTION EPIDURAL; INFILTRATION; INTRACAUDAL; PERINEURAL PRN
Status: DISCONTINUED | OUTPATIENT
Start: 2023-07-13 | End: 2023-07-13 | Stop reason: SDUPTHER

## 2023-07-13 RX ORDER — ALBUTEROL SULFATE 2.5 MG/3ML
2.5 SOLUTION RESPIRATORY (INHALATION) EVERY 4 HOURS PRN
Status: DISCONTINUED | OUTPATIENT
Start: 2023-07-13 | End: 2023-07-14 | Stop reason: HOSPADM

## 2023-07-13 RX ORDER — SODIUM CHLORIDE 9 MG/ML
INJECTION, SOLUTION INTRAVENOUS CONTINUOUS
Status: DISCONTINUED | OUTPATIENT
Start: 2023-07-13 | End: 2023-07-14 | Stop reason: HOSPADM

## 2023-07-13 RX ORDER — DEXAMETHASONE SODIUM PHOSPHATE 4 MG/ML
INJECTION, SOLUTION INTRA-ARTICULAR; INTRALESIONAL; INTRAMUSCULAR; INTRAVENOUS; SOFT TISSUE PRN
Status: DISCONTINUED | OUTPATIENT
Start: 2023-07-13 | End: 2023-07-13 | Stop reason: SDUPTHER

## 2023-07-13 RX ORDER — ONDANSETRON 2 MG/ML
INJECTION INTRAMUSCULAR; INTRAVENOUS PRN
Status: DISCONTINUED | OUTPATIENT
Start: 2023-07-13 | End: 2023-07-13 | Stop reason: SDUPTHER

## 2023-07-13 RX ORDER — PANTOPRAZOLE SODIUM 40 MG/1
40 TABLET, DELAYED RELEASE ORAL EVERY MORNING
Status: DISCONTINUED | OUTPATIENT
Start: 2023-07-13 | End: 2023-07-14 | Stop reason: HOSPADM

## 2023-07-13 RX ORDER — LIDOCAINE HYDROCHLORIDE 10 MG/ML
1 INJECTION, SOLUTION EPIDURAL; INFILTRATION; INTRACAUDAL; PERINEURAL
Status: DISCONTINUED | OUTPATIENT
Start: 2023-07-13 | End: 2023-07-13 | Stop reason: HOSPADM

## 2023-07-13 RX ORDER — FENTANYL CITRATE 50 UG/ML
25 INJECTION, SOLUTION INTRAMUSCULAR; INTRAVENOUS EVERY 5 MIN PRN
Status: DISCONTINUED | OUTPATIENT
Start: 2023-07-13 | End: 2023-07-13 | Stop reason: HOSPADM

## 2023-07-13 RX ORDER — SUCCINYLCHOLINE/SOD CL,ISO/PF 200MG/10ML
SYRINGE (ML) INTRAVENOUS PRN
Status: DISCONTINUED | OUTPATIENT
Start: 2023-07-13 | End: 2023-07-13 | Stop reason: SDUPTHER

## 2023-07-13 RX ORDER — FENTANYL CITRATE 50 UG/ML
100 INJECTION, SOLUTION INTRAMUSCULAR; INTRAVENOUS
Status: DISCONTINUED | OUTPATIENT
Start: 2023-07-13 | End: 2023-07-13 | Stop reason: HOSPADM

## 2023-07-13 RX ORDER — ACETAMINOPHEN 500 MG
1000 TABLET ORAL ONCE
Status: DISCONTINUED | OUTPATIENT
Start: 2023-07-13 | End: 2023-07-13 | Stop reason: HOSPADM

## 2023-07-13 RX ORDER — HYDROMORPHONE HCL 110MG/55ML
PATIENT CONTROLLED ANALGESIA SYRINGE INTRAVENOUS PRN
Status: DISCONTINUED | OUTPATIENT
Start: 2023-07-13 | End: 2023-07-13 | Stop reason: SDUPTHER

## 2023-07-13 RX ORDER — SODIUM CHLORIDE 0.9 % (FLUSH) 0.9 %
5-40 SYRINGE (ML) INJECTION PRN
Status: DISCONTINUED | OUTPATIENT
Start: 2023-07-13 | End: 2023-07-13 | Stop reason: HOSPADM

## 2023-07-13 RX ORDER — DEXMEDETOMIDINE HYDROCHLORIDE 100 UG/ML
INJECTION, SOLUTION INTRAVENOUS PRN
Status: DISCONTINUED | OUTPATIENT
Start: 2023-07-13 | End: 2023-07-13 | Stop reason: SDUPTHER

## 2023-07-13 RX ORDER — BUPIVACAINE HYDROCHLORIDE AND EPINEPHRINE 5; 5 MG/ML; UG/ML
INJECTION, SOLUTION PERINEURAL PRN
Status: DISCONTINUED | OUTPATIENT
Start: 2023-07-13 | End: 2023-07-13 | Stop reason: HOSPADM

## 2023-07-13 RX ORDER — ONDANSETRON 4 MG/1
4 TABLET, ORALLY DISINTEGRATING ORAL EVERY 8 HOURS PRN
Status: DISCONTINUED | OUTPATIENT
Start: 2023-07-13 | End: 2023-07-14 | Stop reason: HOSPADM

## 2023-07-13 RX ORDER — SENNA AND DOCUSATE SODIUM 50; 8.6 MG/1; MG/1
1 TABLET, FILM COATED ORAL DAILY
Status: DISCONTINUED | OUTPATIENT
Start: 2023-07-13 | End: 2023-07-13 | Stop reason: SDUPTHER

## 2023-07-13 RX ORDER — HYDROMORPHONE HYDROCHLORIDE 1 MG/ML
0.5 INJECTION, SOLUTION INTRAMUSCULAR; INTRAVENOUS; SUBCUTANEOUS
Status: DISCONTINUED | OUTPATIENT
Start: 2023-07-13 | End: 2023-07-14 | Stop reason: HOSPADM

## 2023-07-13 RX ORDER — MIDAZOLAM HYDROCHLORIDE 1 MG/ML
INJECTION INTRAMUSCULAR; INTRAVENOUS PRN
Status: DISCONTINUED | OUTPATIENT
Start: 2023-07-13 | End: 2023-07-13 | Stop reason: SDUPTHER

## 2023-07-13 RX ORDER — OXYCODONE HYDROCHLORIDE AND ACETAMINOPHEN 5; 325 MG/1; MG/1
1 TABLET ORAL EVERY 4 HOURS PRN
Qty: 20 TABLET | Refills: 0 | Status: SHIPPED | OUTPATIENT
Start: 2023-07-13 | End: 2023-07-18

## 2023-07-13 RX ORDER — SODIUM CHLORIDE 9 MG/ML
INJECTION, SOLUTION INTRAVENOUS CONTINUOUS
Status: DISCONTINUED | OUTPATIENT
Start: 2023-07-13 | End: 2023-07-13

## 2023-07-13 RX ORDER — FENTANYL CITRATE 50 UG/ML
INJECTION, SOLUTION INTRAMUSCULAR; INTRAVENOUS PRN
Status: DISCONTINUED | OUTPATIENT
Start: 2023-07-13 | End: 2023-07-13 | Stop reason: SDUPTHER

## 2023-07-13 RX ORDER — DIPHENHYDRAMINE HYDROCHLORIDE 50 MG/ML
25 INJECTION INTRAMUSCULAR; INTRAVENOUS EVERY 6 HOURS PRN
Status: DISCONTINUED | OUTPATIENT
Start: 2023-07-13 | End: 2023-07-14 | Stop reason: HOSPADM

## 2023-07-13 RX ORDER — HYDRALAZINE HYDROCHLORIDE 20 MG/ML
10 INJECTION INTRAMUSCULAR; INTRAVENOUS
Status: DISCONTINUED | OUTPATIENT
Start: 2023-07-13 | End: 2023-07-13 | Stop reason: HOSPADM

## 2023-07-13 RX ADMIN — DEXMEDETOMIDINE HYDROCHLORIDE 8 MCG: 100 INJECTION, SOLUTION, CONCENTRATE INTRAVENOUS at 08:53

## 2023-07-13 RX ADMIN — PHENAZOPYRIDINE 200 MG: 100 TABLET ORAL at 06:51

## 2023-07-13 RX ADMIN — HYDROMORPHONE HYDROCHLORIDE 0.5 MG: 2 INJECTION, SOLUTION INTRAMUSCULAR; INTRAVENOUS; SUBCUTANEOUS at 09:56

## 2023-07-13 RX ADMIN — HYDROMORPHONE HYDROCHLORIDE 0.5 MG: 2 INJECTION, SOLUTION INTRAMUSCULAR; INTRAVENOUS; SUBCUTANEOUS at 10:49

## 2023-07-13 RX ADMIN — METRONIDAZOLE 500 MG: 5 INJECTION, SOLUTION INTRAVENOUS at 07:42

## 2023-07-13 RX ADMIN — GLYCOPYRROLATE 0.4 MG: 0.2 INJECTION, SOLUTION INTRAMUSCULAR; INTRAVENOUS at 09:50

## 2023-07-13 RX ADMIN — Medication 80 MCG: at 07:45

## 2023-07-13 RX ADMIN — OXYCODONE HYDROCHLORIDE AND ACETAMINOPHEN 2 TABLET: 5; 325 TABLET ORAL at 14:40

## 2023-07-13 RX ADMIN — FENTANYL CITRATE 50 MCG: 50 INJECTION, SOLUTION INTRAMUSCULAR; INTRAVENOUS at 07:33

## 2023-07-13 RX ADMIN — PROPOFOL 150 MG: 10 INJECTION, EMULSION INTRAVENOUS at 07:35

## 2023-07-13 RX ADMIN — DOCUSATE SODIUM 100 MG: 100 CAPSULE, LIQUID FILLED ORAL at 20:55

## 2023-07-13 RX ADMIN — FENTANYL CITRATE 25 MCG: 50 INJECTION, SOLUTION INTRAMUSCULAR; INTRAVENOUS at 07:54

## 2023-07-13 RX ADMIN — ONDANSETRON HYDROCHLORIDE 4 MG: 2 INJECTION, SOLUTION INTRAMUSCULAR; INTRAVENOUS at 09:50

## 2023-07-13 RX ADMIN — SCOPALAMINE 1 PATCH: 1 PATCH, EXTENDED RELEASE TRANSDERMAL at 07:25

## 2023-07-13 RX ADMIN — ROCURONIUM BROMIDE 45 MG: 10 SOLUTION INTRAVENOUS at 07:45

## 2023-07-13 RX ADMIN — OXYCODONE HYDROCHLORIDE AND ACETAMINOPHEN 2 TABLET: 5; 325 TABLET ORAL at 20:55

## 2023-07-13 RX ADMIN — ROCURONIUM BROMIDE 5 MG: 10 SOLUTION INTRAVENOUS at 07:34

## 2023-07-13 RX ADMIN — WATER 2000 MG: 1 INJECTION INTRAMUSCULAR; INTRAVENOUS; SUBCUTANEOUS at 07:46

## 2023-07-13 RX ADMIN — PHENYLEPHRINE HYDROCHLORIDE 40 MCG/MIN: 10 INJECTION INTRAVENOUS at 07:45

## 2023-07-13 RX ADMIN — SODIUM CHLORIDE, POTASSIUM CHLORIDE, SODIUM LACTATE AND CALCIUM CHLORIDE: 600; 310; 30; 20 INJECTION, SOLUTION INTRAVENOUS at 10:07

## 2023-07-13 RX ADMIN — LIDOCAINE HYDROCHLORIDE 100 MG: 20 INJECTION, SOLUTION EPIDURAL; INFILTRATION; INTRACAUDAL; PERINEURAL at 07:33

## 2023-07-13 RX ADMIN — EPHEDRINE SULFATE 5 MG: 50 INJECTION INTRAVENOUS at 10:08

## 2023-07-13 RX ADMIN — Medication 140 MG: at 07:36

## 2023-07-13 RX ADMIN — SODIUM CHLORIDE, POTASSIUM CHLORIDE, SODIUM LACTATE AND CALCIUM CHLORIDE: 600; 310; 30; 20 INJECTION, SOLUTION INTRAVENOUS at 06:48

## 2023-07-13 RX ADMIN — Medication 3 MG: at 09:50

## 2023-07-13 RX ADMIN — SODIUM CHLORIDE: 9 INJECTION, SOLUTION INTRAVENOUS at 13:00

## 2023-07-13 RX ADMIN — Medication 25 MG: at 07:53

## 2023-07-13 RX ADMIN — ROCURONIUM BROMIDE 20 MG: 10 SOLUTION INTRAVENOUS at 08:49

## 2023-07-13 RX ADMIN — DOCUSATE SODIUM 100 MG: 100 CAPSULE, LIQUID FILLED ORAL at 14:40

## 2023-07-13 RX ADMIN — FENTANYL CITRATE 25 MCG: 0.05 INJECTION, SOLUTION INTRAMUSCULAR; INTRAVENOUS at 11:45

## 2023-07-13 RX ADMIN — FENTANYL CITRATE 25 MCG: 50 INJECTION, SOLUTION INTRAMUSCULAR; INTRAVENOUS at 09:02

## 2023-07-13 RX ADMIN — ONDANSETRON 4 MG: 2 INJECTION INTRAMUSCULAR; INTRAVENOUS at 18:52

## 2023-07-13 RX ADMIN — DEXAMETHASONE SODIUM PHOSPHATE 8 MG: 4 INJECTION, SOLUTION INTRAMUSCULAR; INTRAVENOUS at 07:44

## 2023-07-13 RX ADMIN — MIDAZOLAM 2 MG: 1 INJECTION INTRAMUSCULAR; INTRAVENOUS at 07:28

## 2023-07-13 ASSESSMENT — PAIN SCALES - GENERAL
PAINLEVEL_OUTOF10: 8
PAINLEVEL_OUTOF10: 6

## 2023-07-13 ASSESSMENT — PAIN DESCRIPTION - LOCATION: LOCATION: ABDOMEN

## 2023-07-13 ASSESSMENT — PAIN - FUNCTIONAL ASSESSMENT: PAIN_FUNCTIONAL_ASSESSMENT: NONE - DENIES PAIN

## 2023-07-13 NOTE — PERIOP NOTE
TRANSFER - OUT REPORT:    Verbal report given to [facility], RN on Salas Ibanez  being transferred to [facility] for routine post-op       Report consisted of patient's Situation, Background, Assessment and   Recommendations(SBAR). Information from the following report(s) Surgery Report and MAR was reviewed with the receiving nurse. Lines:   Peripheral IV 07/13/23 Right; Anterior Forearm (Active)   Site Assessment Clean, dry & intact 07/13/23 1200   Line Status Infusing 07/13/23 1200   Line Care Connections checked and tightened 07/13/23 1200   Phlebitis Assessment No symptoms 07/13/23 1200   Infiltration Assessment 0 07/13/23 1200   Alcohol Cap Used Yes 07/13/23 1200   Dressing Status Clean, dry & intact 07/13/23 1200   Dressing Type Transparent 07/13/23 1200        Opportunity for questions and clarification was provided. Patient transported with:  Tech    Pt family updated.

## 2023-07-13 NOTE — PERIOP NOTE
Keke Vogt MRN: [id number]  SSN: [id number]   YOB: 1970  Age: 47 y.o. Sex: female     Patient is status post Procedure(s):  ROBOTIC TOTAL LAPAROSCOPIC HYSTERECTOMY, BILATERAL SALPINGECTOMY, CYSTOSCOPY, UTEROSACRAL LIGAMENT SUSPENSION, MID URETHRAL SLING; POSTERIOR REPAIR. Surgeon(s) and Role:     * Tree William MD - Primary     * Jonathan Modi MD - Assisting    Local/Dose/Irrigation:  See [facility]                  Peripheral IV 07/13/23 Right; Anterior Forearm (Active)          Urinary Catheter 07/13/23 2 Way; [place] (Active)      ETT  (Active)   Secured At 21 cm 07/13/23 0004   Measured From Lips 07/13/23 0004                   Dressing/Packing:      Trocar Sites x5 - Dermabond  Suprapubic Incision x2 - Dermabond  Peripad

## 2023-07-13 NOTE — PROGRESS NOTES
TRANSFER - IN REPORT:    Verbal report received from TATIANNA Couch RN on [facility]  being received from PACU for routine post-op      Report consisted of patient's Situation, Background, Assessment and   Recommendations(SBAR). Information from the following report(s) Nurse Handoff Report, Index, Adult Overview, Surgery Report, Intake/Output, MAR, and Recent Results was reviewed with the receiving nurse. Opportunity for questions and clarification was provided. Assessment completed upon patient's arrival to unit and care assumed.

## 2023-07-13 NOTE — OP NOTE
hemostasis was assured. The excess mons pubis mesh was trimmed. The skin edges were released. Hemostasis was assured and these incisions were closed with Dermabond. I then trimmed the excess vaginal epithelial skin from the anterior repair and closed this incision with a 2-0 vicryl suture in two layers. The introitus was noted to be very gaping from minimal perineal support and a small distal rectocele noted. A long skinny brian shaped incision was demarcated with allis clamps and 25% marcaine with epinephrine injected and this area was carefully de-epithelialized. Hemostasis obtained with cautery. Skin edges were released and a series of vicryl sutures were used to approximate the midline defect making the introitus normal size but not overtightening. Rectal exam was normal.     All instruments and packs were removed and counted. Counts were correct. The patient was returned to the supine position, awakened from her anesthetic, and transferred to the recovery room. I spoke to her  and daughter in the waiting room.

## 2023-07-14 VITALS
HEART RATE: 85 BPM | OXYGEN SATURATION: 95 % | BODY MASS INDEX: 32.78 KG/M2 | TEMPERATURE: 97.8 F | RESPIRATION RATE: 16 BRPM | DIASTOLIC BLOOD PRESSURE: 71 MMHG | SYSTOLIC BLOOD PRESSURE: 103 MMHG | WEIGHT: 204 LBS | HEIGHT: 66 IN

## 2023-07-14 LAB
BASOPHILS # BLD: 0 K/UL (ref 0–0.1)
BASOPHILS NFR BLD: 0 % (ref 0–1)
DIFFERENTIAL METHOD BLD: ABNORMAL
EOSINOPHIL # BLD: 0 K/UL (ref 0–0.4)
EOSINOPHIL NFR BLD: 0 % (ref 0–7)
ERYTHROCYTE [DISTWIDTH] IN BLOOD BY AUTOMATED COUNT: 13.6 % (ref 11.5–14.5)
HCT VFR BLD AUTO: 35.7 % (ref 35–47)
HGB BLD-MCNC: 11.1 G/DL (ref 11.5–16)
IMM GRANULOCYTES # BLD AUTO: 0.1 K/UL (ref 0–0.04)
IMM GRANULOCYTES NFR BLD AUTO: 0 % (ref 0–0.5)
LYMPHOCYTES # BLD: 3.1 K/UL (ref 0.8–3.5)
LYMPHOCYTES NFR BLD: 23 % (ref 12–49)
MCH RBC QN AUTO: 26.4 PG (ref 26–34)
MCHC RBC AUTO-ENTMCNC: 31.1 G/DL (ref 30–36.5)
MCV RBC AUTO: 84.8 FL (ref 80–99)
MONOCYTES # BLD: 0.8 K/UL (ref 0–1)
MONOCYTES NFR BLD: 6 % (ref 5–13)
NEUTS SEG # BLD: 9.5 K/UL (ref 1.8–8)
NEUTS SEG NFR BLD: 71 % (ref 32–75)
NRBC # BLD: 0 K/UL (ref 0–0.01)
NRBC BLD-RTO: 0 PER 100 WBC
PLATELET # BLD AUTO: 300 K/UL (ref 150–400)
PMV BLD AUTO: 11.2 FL (ref 8.9–12.9)
RBC # BLD AUTO: 4.21 M/UL (ref 3.8–5.2)
WBC # BLD AUTO: 13.5 K/UL (ref 3.6–11)

## 2023-07-14 PROCEDURE — 85025 COMPLETE CBC W/AUTO DIFF WBC: CPT

## 2023-07-14 PROCEDURE — G0378 HOSPITAL OBSERVATION PER HR: HCPCS

## 2023-07-14 PROCEDURE — 36415 COLL VENOUS BLD VENIPUNCTURE: CPT

## 2023-07-14 PROCEDURE — 6370000000 HC RX 637 (ALT 250 FOR IP): Performed by: OBSTETRICS & GYNECOLOGY

## 2023-07-14 RX ADMIN — OXYCODONE HYDROCHLORIDE AND ACETAMINOPHEN 2 TABLET: 5; 325 TABLET ORAL at 04:02

## 2023-07-14 RX ADMIN — PANTOPRAZOLE SODIUM 40 MG: 40 TABLET, DELAYED RELEASE ORAL at 08:41

## 2023-07-14 RX ADMIN — DOCUSATE SODIUM 100 MG: 100 CAPSULE, LIQUID FILLED ORAL at 08:41

## 2023-07-14 NOTE — DISCHARGE SUMMARY
Gynecology Discharge Summary     Patient ID:  Pradeep Cloud  [id number]  82 y.o.  1970    Admit date: 7/13/2023    Discharge date: 7/14/2023     Admission Diagnoses:   Patient Active Problem List   Diagnosis    Colitis    SALOMON (stress urinary incontinence, female)    Incomplete uterovaginal prolapse    Pelvic organ prolapse quantification stage 2 cystocele    Observation after surgery       Discharge Diagnoses: There are no discharge diagnoses documented for the most recent discharge. Patient Active Problem List   Diagnosis    Colitis    SALOMON (stress urinary incontinence, female)    Incomplete uterovaginal prolapse    Pelvic organ prolapse quantification stage 2 cystocele    Observation after surgery       Procedures for this admission: Procedure(s):  ROBOTIC TOTAL LAPAROSCOPIC HYSTERECTOMY, BILATERAL SALPINGECTOMY, CYSTOSCOPY, UTEROSACRAL LIGAMENT SUSPENSION, MID URETHRAL SLING; POSTERIOR REPAIR    Hospital Course:    Disposition: Home or self care    Discharged Condition: stable            Patient Instructions:   Current Discharge Medication List        START taking these medications    Details   oxyCODONE-acetaminophen (PERCOCET) 5-325 MG per tablet Take 1 tablet by mouth every 4 hours as needed for Pain for up to 5 days. Intended supply: 5 days.  Take lowest dose possible to manage pain Max Daily Amount: 6 tablets  Qty: 20 tablet, Refills: 0    Comments: Reduce doses taken as pain becomes manageable  Associated Diagnoses: Incomplete uterovaginal prolapse           CONTINUE these medications which have NOT CHANGED    Details   inFLIXimab (REMICADE) 100 MG injection INFUSE 468 MG IV EVERY 6 WEEKS  Qty: 5 each, Refills: 3      traMADol (ULTRAM) 50 MG tablet       Multiple Vitamins-Minerals (MULTIVITAMIN ADULTS PO) multivitamin      albuterol sulfate HFA (PROVENTIL;VENTOLIN;PROAIR) 108 (90 Base) MCG/ACT inhaler Inhale 1 puff into the lungs every 4 hours as needed      pantoprazole (PROTONIX) 40 MG tablet

## 2023-07-14 NOTE — PROGRESS NOTES
Bedside and Verbal shift change report given to Manuelito Johnston RN (oncoming nurse) by Sulaiman Alejo RN (offgoing nurse). Report included the following information Nurse Handoff Report, Index, Intake/Output, MAR, and Recent Results. 0400: Patient called out complaining of severe iniguez pain. This RN adjusted and drained iniguez to no relief. Pt agreeable to performing voiding trial now. 9424-0623: Started to perform voiding trial. However patient was unable to tolerate instilling no more than 175 ml into bladder. Patient immediately voided 100 ml - but wanted to give self more time to try and void more. Patient stated she will drink some water and retry in a little bit. 0500: Patient drank 400 ml of water and voided another 100 ml.      0520: Bladder scanned patient for ~190 ml of urine. [id number] Ca out to [facility], MD to provide an update. Okay to leave iniguez out for now -- wants next void to be 200 ml or more.

## 2023-07-14 NOTE — PROGRESS NOTES
I have reviewed the provider's instructions and medications with the patient, answering all questions to her satisfaction.

## 2023-07-17 ENCOUNTER — CARE COORDINATION (OUTPATIENT)
Dept: OTHER | Facility: CLINIC | Age: 53
End: 2023-07-17

## 2023-07-17 NOTE — CARE COORDINATION
[facility] Care Transitions Initial Follow Up Call    Call within 2 business days of discharge: Yes    Patient Current Location:  [facility] Transition Nurse contacted the patient by telephone to perform post hospital discharge assessment. Verified name and  with patient as identifiers. Provided introduction to self, and explanation of the Care Transition Nurse role. Patient: France Vogt Patient : 1970   MRN: [id number]  Reason for Admission: ROBOTIC TOTAL LAPAROSCOPIC HYSTERECTOMY, BILATERAL SALPINGECTOMY, CYSTOSCOPY, UTEROSACRAL LIGAMENT SUSPENSION, MID URETHRAL SLING; POSTERIOR REPAIR  Discharge Date: 23 RARS: No data recorded    Last Discharge [address] [facility]       Date Complaint Diagnosis Description Type Department Provider    23  Incomplete uterovaginal prolapse Admission (Discharged) Julian Cash MD       Was this an external facility discharge? No Discharge Facility: [facility]    Challenges to be reviewed by the provider   Additional needs identified to be addressed with provider: No  none               Method of communication with provider: none. S/w pt today s/p ROBOTIC TOTAL LAPAROSCOPIC HYSTERECTOMY, BILATERAL SALPINGECTOMY, CYSTOSCOPY, UTEROSACRAL LIGAMENT SUSPENSION, MID URETHRAL SLING; POSTERIOR REPAIR. Pt has minimal pain and no bleeding. She has lap sites x5 c/d/I thus far. Pt reports she is doing well thus far. She will be off from work for [employer]. She had no questions for me. Care Transition Nurse reviewed discharge instructions, medical action plan, and red flags with patient who verbalized understanding. The patient was given an opportunity to ask questions and does not have any further questions or concerns at this time. Were discharge instructions available to patient? Yes.  Reviewed appropriate site of care based on symptoms and resources available to patient including: Benefits related nurse triage line  When to call [phone]  Condition related

## 2023-07-18 RX ORDER — MESALAMINE 1000 MG/1
SUPPOSITORY RECTAL
COMMUNITY
Start: 2023-05-10

## 2023-07-18 RX ORDER — PREDNISONE 5 MG/1
TABLET ORAL
COMMUNITY
Start: 2023-05-15

## 2023-07-18 NOTE — PROGRESS NOTES
proctitis consistent with UC). Pseudopolyps whole colon. Allergies   Allergen Reactions    Banana Other (See Comments) and Hives     Vomiting     Nsaids Other (See Comments)     Exacerbation of Ulcerative Colitis/history of ulcers    Sulfa Antibiotics Other (See Comments) and Hives     Denies allergy       Past Medical History:   Diagnosis Date    Asthma     FROM GERD    Autoimmune disorder ([address])     \"HYPERACTIVE IMMUNE SYSTEM\"    GERD (gastroesophageal reflux disease)     Lyme disease 1993    Ulcerative colitis ([facility])       Social History     Tobacco Use    Smoking status: Never    Smokeless tobacco: Never   Substance Use Topics    Alcohol use: Yes     Comment: rare     Family History   Problem Relation Age of Onset    Alcohol Abuse Mother     No Known Problems Brother     Anesth Problems Neg Hx        INTERM HISTORY  Have you been diagnosed with any additional conditions since we last talked? no  Have you developed any new allergies since we last talked? no  Have you stopped taking any medications or supplements since we last talked? no  Have you started taking any additional medications or supplements since we last talked? yes, took percocet for a couple doses following hysterectomy. Since has not needed. REVIEW OF CURRENT DISEASE STATE   Rheumatoid Arthritis: Patient with diagnosis of rheumatoid arthritis being seen in regards to specialty medication use. Patient has been stable  on Remicade for >10 years for ulcerative colitis, but this past January an insurance issue caused her to miss her infusions. She did not experience any symptoms of her Ulcerative colitis during this time, but reports in April she experienced an arthritis  flare -  swelling in hands, wrists, elbows and knees. Patient could not make a fist. Feels like walking on a golf ball. Gained 12 pounds from swelling.  Patient saw PCP and they took labs and determined she needed to be seen by rheumatologist. Patient  was treated with steroids

## 2023-07-19 ENCOUNTER — PHARMACY VISIT (OUTPATIENT)
Facility: HOSPITAL | Age: 53
End: 2023-07-19

## 2023-07-19 ENCOUNTER — TELEPHONE (OUTPATIENT)
Facility: HOSPITAL | Age: 53
End: 2023-07-19

## 2023-07-19 DIAGNOSIS — M06.09 RHEUMATOID ARTHRITIS WITHOUT RHEUMATOID FACTOR, MULTIPLE SITES (HCC): Primary | ICD-10-CM

## 2023-07-19 ASSESSMENT — PATIENT HEALTH QUESTIONNAIRE - PHQ9
1. LITTLE INTEREST OR PLEASURE IN DOING THINGS: 0
2. FEELING DOWN, DEPRESSED OR HOPELESS: 0
SUM OF ALL RESPONSES TO PHQ QUESTIONS 1-9: 0
SUM OF ALL RESPONSES TO PHQ9 QUESTIONS 1 & 2: 0

## 2023-07-19 NOTE — TELEPHONE ENCOUNTER
Specialty Medication Service    Date: 7/19/2023  Patient's Name: Zane Potts YOB: 1970            _____________________________________________________________________________________________    Faxed office to clarify what dosing frequency infliximab should be. There is conflicting information sent by the provider. We have always filled every 6 weeks, however newest prescription (06/08/23) was for every 8 weeks. Per patient, she was unaware of this change. Requested office clarify or send updated script to [facility]. Will follow-up as needed.      Yamileth Wilkins, PharmD [facility]  Ambulatory Clinical Pharmacist  Specialty Medication Services  Phone: [phone] option #4  Fax: [phone]    For Pharmacy Admin Tracking Only    Program: SMS  CPA in place:  Yes  Recommendation Provided To: Provider: 1 via Fax sent to office  Intervention Detail: Refill(s) Provided  Intervention Accepted By: Provider: 0    Time Spent (min): 15

## 2023-07-19 NOTE — TELEPHONE ENCOUNTER
Specialty Medication Service    Date: 7/19/2023  Patient's Name: Blanka Paiz YOB: 1970            _____________________________________________________________________________________________    Rohith Ring patient's specialist office to request most recent office visit summary and relevant labs for Specialty Medication Service formulary medication. Talked to representative in specialist's office to have faxed to [phone]. Received and scanned into media.      Deacon Khan, PharmD [address] [place] [address]  Ambulatory Clinical Pharmacist  Specialty Medication Services  Phone: [phone] option #4  Fax: [phone]    For Pharmacy Admin Tracking Only    Program: SMS  CPA in place:  Yes  Recommendation Provided To: Provider: 1 via Called provider office  Intervention Accepted By: Provider: 1    Time Spent (min): 15

## 2023-07-19 NOTE — TELEPHONE ENCOUNTER
Specialty Medication Service    Date: 7/19/2023  Patient's Name: France Vogt YOB: 1970            _____________________________________________________________________________________________    Kingsley Noemi patient's specialist office to request most recent office visit summary and relevant labs for Specialty Medication Service formulary medication. Talked to representative in specialist's office to have faxed to [phone]. Notes received and scanned into media.      Will North, PharmD [facility]  Ambulatory Clinical Pharmacist  Specialty Medication Services  Phone: [phone] option #4  Fax: [phone]    For Pharmacy Admin Tracking Only    Program: SMS  CPA in place:  Yes  Recommendation Provided To: Provider: 1 via Called provider office  Intervention Accepted By: Provider: 1    Time Spent (min): 15

## 2023-07-26 ENCOUNTER — CARE COORDINATION (OUTPATIENT)
Dept: OTHER | Facility: CLINIC | Age: 53
End: 2023-07-26

## 2023-07-26 NOTE — CARE COORDINATION
[facility] Care Transitions Follow Up Call    Patient Current Location:  [facility] Transition Nurse contacted the patient by telephone to follow up after admission on 23. Verified name and  with patient as identifiers. Patient: Yissel Merida  Patient : 1970   MRN: [id number]  Reason for Admission: ROBOTIC TOTAL LAPAROSCOPIC HYSTERECTOMY, BILATERAL SALPINGECTOMY, CYSTOSCOPY, UTEROSACRAL LIGAMENT SUSPENSION  Discharge Date: 23 RARS: No data recorded    Needs to be reviewed by the provider   Additional needs identified to be addressed with provider: Yes  none             Method of communication with provider: none. S/w pt today she is still recovering extremely well. There are no issues with pain or bleeding so far. She will follow-up with OB in August.       Addressed changes since last contact:  none  Discussed follow-up appointments. If no appointment was previously scheduled, appointment scheduling offered: Yes. Is follow up appointment scheduled within 7 days of discharge? Yes. Follow Up  Future Appointments   Date Time Provider [facility] [address]   2023 12:30 PM Amparo Cardenas MD [facility]   2024 12:00 PM BRIE Taylor [facility] [address] [place] [address] follow up appointment(s): 23    Care Transition Nurse reviewed discharge instructions, medical action plan, and red flags with patient and discussed any barriers to care and/or understanding of plan of care after discharge. Discussed appropriate site of care based on symptoms and resources available to patient including: Benefits related nurse triage line  When to call 911  Condition related references. The patient agrees to contact the PCP office for questions related to their healthcare. Advance Care Planning:   not on file.        [address] [facility] Transitions 24 Hour Follow Up Call    2023    Patient: Yissel Merida Patient : 1970    MRN: [id number]  Reason for Admission: There are no

## 2023-08-22 ENCOUNTER — PHARMACY VISIT (OUTPATIENT)
Facility: HOSPITAL | Age: 53
End: 2023-08-22

## 2023-08-22 DIAGNOSIS — M06.09 RHEUMATOID ARTHRITIS WITHOUT RHEUMATOID FACTOR, MULTIPLE SITES (HCC): Primary | ICD-10-CM

## 2023-08-22 RX ORDER — INFLIXIMAB 100 MG/10ML
INJECTION, POWDER, LYOPHILIZED, FOR SOLUTION INTRAVENOUS
Qty: 5 EACH | Refills: 2 | Status: SHIPPED | OUTPATIENT
Start: 2023-08-22 | End: 2023-08-22 | Stop reason: CLARIF

## 2023-08-22 NOTE — PROGRESS NOTES
Specialty Medication Follow up Virtual Visit  [address] [facility]  [address] [facility] [address]  Dept: [phone]  Dept Fax: [phone]  Date of patient's visit: 8/22/2023  Patient's Name:  Jessica Wharton YOB: 1970            Patient Care Team:  Ching Stearns MD as PCP - Sherry Jones MD as PCP - Empaneled Provider  Shell Villavicencio RN as Ambulatory Care Manager  ================================================================    REASON FOR VISIT/CHIEF COMPLAINT:  No chief complaint on file. HISTORY OF PRESENTING ILLNESS:  Jessica Wharton is 47 y.o. is here for a follow up virtual visit for specialty medication. Specialty Medication: Infliximab  Frequency: every 6 weeks - every 7 weeks   Indication: RA  Initially Diagnosed:   Specialist: Dr. Kristen Fitch   Last Specialist Visit: 3 weeks ago  Side effects includes: NA   Last visit with me was: NA   Jessica Wharton has no new complain today. DIAGNOSTIC FINDINGS:  CBC:  Lab Results   Component Value Date/Time    WBC 13.5 07/14/2023 04:31 AM    HGB 11.1 07/14/2023 04:31 AM     07/14/2023 04:31 AM       BMP:    Lab Results   Component Value Date/Time     08/02/2021 05:33 AM    K 3.3 08/02/2021 05:33 AM     08/02/2021 05:33 AM    CO2 23 08/02/2021 05:33 AM    BUN 11 08/02/2021 05:33 AM    CREATININE 0.83 08/02/2021 05:33 AM    GLUCOSE 102 08/02/2021 05:33 AM       HEMOGLOBIN A1C: No results found for: LABA1C    FASTING LIPID PANEL:  Lab Results   Component Value Date    CHOL 189 09/01/2020    HDL 73 09/01/2020    TRIG 79 09/01/2020       PHYSICAL EXAM:  There were no vitals filed for this visit. BP Readings from Last 3 Encounters:   07/14/23 103/71   07/06/23 135/85   05/24/23 125/85          ASSESSMENT AND PLAN:  There are no diagnoses linked to this encounter.   FOLLOW UP AND INSTRUCTIONS:  Given the patient's condition, the patient

## 2023-11-10 ENCOUNTER — ENROLLMENT (OUTPATIENT)
Facility: HOSPITAL | Age: 53
End: 2023-11-10

## 2023-11-22 ENCOUNTER — TELEPHONE (OUTPATIENT)
Facility: HOSPITAL | Age: 53
End: 2023-11-22

## 2023-11-22 DIAGNOSIS — M06.09 RHEUMATOID ARTHRITIS WITHOUT RHEUMATOID FACTOR, MULTIPLE SITES (HCC): Primary | ICD-10-CM

## 2023-11-22 NOTE — TELEPHONE ENCOUNTER
Specialty Medication Service    Date: 11/22/2023  Patient's Name: Ben Gresham YOB: 1970            _____________________________________________________________________________________________    Ben Gresham is a 47 y.o. female enrolled in the Specialty Medication Service. We received a refill request for Infliximab on 11/22/23. Original Rx was written for 1 (90 day) fills on 11/20/23.      Thank you,    Airam Izaguirre      Requested Prescriptions     Pending Prescriptions Disp Refills    inFLIXimab (REMICADE) 100 MG injection 5 each 0     Sig: Infuse 5MG/KG IV every 6 weeks

## 2023-11-24 RX ORDER — INFLIXIMAB 100 MG/10ML
INJECTION, POWDER, LYOPHILIZED, FOR SOLUTION INTRAVENOUS
Qty: 5 EACH | Refills: 0 | Status: SHIPPED | OUTPATIENT
Start: 2023-11-24

## 2023-11-24 NOTE — TELEPHONE ENCOUNTER
CLINICAL PHARMACY NOTE - SPECIALTY MEDICATION SERVICE      Shane Gonzales is a 47 y.o.  female enrolled in the Specialty Medication Service. Patient does have a signed CPA on file. Chart reviewed and patient is compliant with [facility] program requirements. Originally concerned she no showed the visit with Dr. Pancho Ramires in August, however upon speaking to patient states this visit was completed. Emailed Dr. Pancho Ramires in meantime to see if can update note, however patient visit requirements have been met. Labs have been reviewed (07/14/23) as well as labs from 06/22/23. Neutrophils were elevated in July, however likely due to illness at the time. Will reassess at next SMS. Patient should be seeing specialist again 12/2023. Provided a new refill for patient on 11/20/23; No significant concerns noted. Next [facility] visit scheduled/anticipated for 01/24/24. Will approve refill for 6 weeks supply per original specialist's order.       Orders Placed This Encounter    inFLIXimab (REMICADE) 100 MG injection     Sig: Infuse 5MG/KG IV every 6 weeks     Dispense:  5 each     Refill:  0          Patti Javed PharmD [facility]  Ambulatory Clinical Pharmacist  Specialty Medication Services  Phone: [phone] option #4  Fax: [phone]      For Pharmacy Admin Tracking Only    Program: [facility]  CPA in place:  Yes  Recommendation Provided To: Patient/Caregiver: 1 via Telephone  Intervention Detail: Refill(s) Provided  Intervention Accepted By: Patient/Caregiver: 1    Time Spent (min): 15

## 2024-01-18 ENCOUNTER — TELEPHONE (OUTPATIENT)
Facility: HOSPITAL | Age: 54
End: 2024-01-18

## 2024-01-18 DIAGNOSIS — M06.09 RHEUMATOID ARTHRITIS WITHOUT RHEUMATOID FACTOR, MULTIPLE SITES (HCC): Primary | ICD-10-CM

## 2024-01-18 NOTE — TELEPHONE ENCOUNTER
Specialty Medication Service    Date: 1/18/2024  Patient's Name: Carissa Merchant YOB: 1970            _____________________________________________________________________________________________    Carissa Merchant is a 53 y.o. female enrolled in the Specialty Medication Service.     We received a refill request for Infliximab on 01/18/24.     Original Rx was written for 12 fills on 01/18/24.     Thank you,    Grace Recinos      Requested Prescriptions     Pending Prescriptions Disp Refills    inFLIXimab (REMICADE) 100 MG injection 5 each 0     Sig: Infuse 5MG/KG IV every 6 weeks

## 2024-01-19 RX ORDER — INFLIXIMAB 100 MG/10ML
INJECTION, POWDER, LYOPHILIZED, FOR SOLUTION INTRAVENOUS
Qty: 5 EACH | Refills: 11 | Status: SHIPPED | OUTPATIENT
Start: 2024-01-19

## 2024-01-19 NOTE — TELEPHONE ENCOUNTER
Specialty Medication Service    Date: 1/19/2024  Patient's Name: Carissa Merchant YOB: 1970            _____________________________________________________________________________________________    Inbound fax received from external provider containing patient medical records requested by SMS. Patient identifiers confirmed on each page to ensure accuracy and protection of privacy. Imported into the chart media, PharmD aware notes are available for viewing.    Jayla Brooks CPhT  Pharmacy   Specialty Medication Services   Phone: [phone] option 4      For Pharmacy Admin Tracking Only    Program: [facility]  CPA in place:  Yes  Recommendation Provided To: Provider: 1 via Called provider office  Intervention Detail:   Intervention Accepted By: Provider: 1  Gap Closed?:    Time Spent (min): 15

## 2024-01-19 NOTE — TELEPHONE ENCOUNTER
Specialty Medication Service    Date: 1/19/2024  Patient's Name: Carissa Merchant YOB: 1970            _____________________________________________________________________________________________    Reviewed most recent labs and neutropenia has resolved after infection in July. No concerns with any of the labs today for continuing infliximab. Will follow-up with patient at visit next week.     Arnoldo Wan, PharmD [facility]  Ambulatory Clinical Pharmacist  Specialty Medication Services  Phone: [phone] option #4  Fax: [phone]    For Pharmacy Admin Tracking Only    Program: SMS  CPA in place:  Yes  Recommendation Provided To: Provider: 1 via Note to Provider and Patient/Caregiver: 1 via Telephone  Intervention Detail: Refill(s) Provided  Intervention Accepted By: Provider: 1 and Patient/Caregiver: 1    Time Spent (min): 20

## 2024-01-19 NOTE — TELEPHONE ENCOUNTER
CLINICAL PHARMACY NOTE - SPECIALTY MEDICATION SERVICE      Carissa Merchant is a 53 y.o.  female enrolled in the Specialty Medication Service. Patient has previous verbal CPA agreement and will obtain updated CPA agreement next week at appointment 1/24/24.       Chart reviewed and patient is compliant with [facility] program requirements. Labs have been reviewed (07/14/23) as well as labs from 06/22/23. Neutrophils were elevated in July, however likely due to illness at the time. Will reassess at next SMS. Patient should have seen again 12/2023.  Left message today with Dr. Bruno to request chart notes and updated labs. No significant concerns noted. Next [facility] visit scheduled/anticipated for 01/24/2024.       Will approve refill for 1 year per original specialist's order.      No orders of the defined types were placed in this encounter.         Arnoldo Wan, PharmD [facility]  Ambulatory Clinical Pharmacist  Specialty Medication Services  Phone: [phone] option #4  Fax: [phone]     For Pharmacy Admin Tracking Only    Program: [facility]  CPA in place:  Yes  Recommendation Provided To: Provider: 1 via Called provider office and Patient/Caregiver: 1 via Telephone  Intervention Detail: Refill(s) Provided  Intervention Accepted By: Provider: 0 and Patient/Caregiver: 1  Time Spent (min): 15

## 2024-01-23 NOTE — PROGRESS NOTES
drug-drug interactions identified. Allergy and diagnosis info reviewed and updated.  Carissa Merchant has a history remarkable for the following conditions: rheumatoid arthritis, ulcers    Current therapy includes: Remicade 5mg/kg every 7 weeks (plan to move to every 8 weeks in future)     Medication Effectiveness: Patient disease is well controlled on current therapy, per symptom assessment. Patient denies flares and reports being stable for 20 years. Colonoscopy, March 2023 was mostly normal. Some minor inflammation that resolved. Only seeing GI yearly, next follow-up soon. No new concerns.     Patient had no questions regarding the medication's warnings, precautions, and contraindications. Confirmed appropriate storage and disposal.    Patient had no concerns with the administration process.    Current disease state symptoms include: None; continues to have 1 bowel movement per day that is formed, denies any other symptoms.     No side effects/adverse events reported, and no adherence issues identified.    Functional and cognitive limitations include: none    Patient is not considered high risk. Based on patient feedback/results of the assessment, the therapy is still appropriate.    No medication-related problem(s) or patient need(s) identified that would require a care plan. Follow up in 180 days   3. Immunizations  Immunization status: up to date and documented, missing doses of Shingrix, [id number].   Shingrix: denies - states never had chicken pox and has been vaccinated with varicella vaccine. Made her feel terrible. Will not pursue Shingles vaccine. No further action needed.   [id number]: Has not received yet, but patient states still plans to obtain Follow-up next [facility].     4. Drug Interactions  No new drug interactions identified category D or higher today.     5. Other Identified Potential Issues  Patient has completed SMS consent form and Telehealth consent form. No questions in regard to consent form.

## 2024-01-24 ENCOUNTER — TELEPHONE (OUTPATIENT)
Facility: HOSPITAL | Age: 54
End: 2024-01-24

## 2024-01-24 ENCOUNTER — PHARMACY VISIT (OUTPATIENT)
Facility: HOSPITAL | Age: 54
End: 2024-01-24

## 2024-01-24 DIAGNOSIS — M06.09 RHEUMATOID ARTHRITIS WITHOUT RHEUMATOID FACTOR, MULTIPLE SITES (HCC): Primary | ICD-10-CM

## 2024-01-24 RX ORDER — OXYCODONE HYDROCHLORIDE AND ACETAMINOPHEN 5; 325 MG/1; MG/1
TABLET ORAL
COMMUNITY
Start: 2024-01-18

## 2024-01-24 RX ORDER — VALACYCLOVIR HYDROCHLORIDE 500 MG/1
TABLET, FILM COATED ORAL
COMMUNITY
Start: 2024-01-17

## 2024-01-24 RX ORDER — CEPHALEXIN 500 MG/1
CAPSULE ORAL
COMMUNITY
Start: 2024-01-17

## 2024-01-24 RX ORDER — ERYTHROMYCIN 5 MG/G
OINTMENT OPHTHALMIC
COMMUNITY
Start: 2024-01-17

## 2024-01-24 RX ORDER — ONDANSETRON 8 MG/1
TABLET, ORALLY DISINTEGRATING ORAL
COMMUNITY
Start: 2024-01-17

## 2024-01-24 RX ORDER — TRAMADOL HYDROCHLORIDE 50 MG/1
TABLET ORAL
COMMUNITY
Start: 2023-11-09

## 2024-01-24 ASSESSMENT — PROMIS GLOBAL HEALTH SCALE
IN THE PAST 7 DAYS, HOW OFTEN HAVE YOU BEEN BOTHERED BY EMOTIONAL PROBLEMS, SUCH AS FEELING ANXIOUS, DEPRESSED, OR IRRITABLE [ON A SCALE FROM 1 (NEVER) TO 5 (ALWAYS)]?: 5
TO WHAT EXTENT ARE YOU ABLE TO CARRY OUT YOUR EVERYDAY PHYSICAL ACTIVITIES SUCH AS WALKING, CLIMBING STAIRS, CARRYING GROCERIES, OR MOVING A CHAIR [ON A SCALE OF 1 (NOT AT ALL) TO 5 (COMPLETELY)]?: 4
IN THE PAST 7 DAYS, HOW WOULD YOU RATE YOUR FATIGUE ON AVERAGE [ON A SCALE FROM 1 (NONE) TO 5 (VERY SEVERE)]?: 5
IN GENERAL, HOW WOULD YOU RATE YOUR MENTAL HEALTH, INCLUDING YOUR MOOD AND YOUR ABILITY TO THINK [ON A SCALE OF 1 (POOR) TO 5 (EXCELLENT)]?: 5
IN THE PAST 7 DAYS, HOW WOULD YOU RATE YOUR PAIN ON AVERAGE [ON A SCALE FROM 0 (NO PAIN) TO 10 (WORST IMAGINABLE PAIN)]?: 1
IN GENERAL, HOW WOULD YOU RATE YOUR PHYSICAL HEALTH [ON A SCALE OF 1 (POOR) TO 5 (EXCELLENT)]?: 4
SUM OF RESPONSES TO QUESTIONS 2, 4, 5, & 10: 20
IN GENERAL, PLEASE RATE HOW WELL YOU CARRY OUT YOUR USUAL SOCIAL ACTIVITIES (INCLUDES ACTIVITIES AT HOME, AT WORK, AND IN YOUR COMMUNITY, AND RESPONSIBILITIES AS A PARENT, CHILD, SPOUSE, EMPLOYEE, FRIEND, ETC) [ON A SCALE OF 1 (POOR) TO 5 (EXCELLENT)]?: 5
IN GENERAL, WOULD YOU SAY YOUR HEALTH IS...[ON A SCALE OF 1 (POOR) TO 5 (EXCELLENT)]: 4
IN GENERAL, WOULD YOU SAY YOUR QUALITY OF LIFE IS...[ON A SCALE OF 1 (POOR) TO 5 (EXCELLENT)]: 5
SUM OF RESPONSES TO QUESTIONS 3, 6, 7, & 8: 14
IN GENERAL, HOW WOULD YOU RATE YOUR SATISFACTION WITH YOUR SOCIAL ACTIVITIES AND RELATIONSHIPS [ON A SCALE OF 1 (POOR) TO 5 (EXCELLENT)]?: 5

## 2024-01-24 ASSESSMENT — PATIENT HEALTH QUESTIONNAIRE - PHQ9
SUM OF ALL RESPONSES TO PHQ9 QUESTIONS 1 & 2: 0
SUM OF ALL RESPONSES TO PHQ QUESTIONS 1-9: 0
SUM OF ALL RESPONSES TO PHQ QUESTIONS 1-9: 0
1. LITTLE INTEREST OR PLEASURE IN DOING THINGS: 0
SUM OF ALL RESPONSES TO PHQ QUESTIONS 1-9: 0
SUM OF ALL RESPONSES TO PHQ QUESTIONS 1-9: 0
2. FEELING DOWN, DEPRESSED OR HOPELESS: 0

## 2024-01-24 NOTE — TELEPHONE ENCOUNTER
Specialty Medication Service    Date: 1/24/2024  Patient's Name: Carissa Merchant YOB: 1970            _____________________________________________________________________________________________    Called patient's specialist office to request most recent TB test for Specialty Medication Service formulary medication. Left message to have faxed to [phone].     Ghazal ChoD [facility]  Ambulatory Clinical Pharmacist  Specialty Medication Services  Phone: [phone] option #4  Fax: [phone]    For Pharmacy Admin Tracking Only    Program: SMS  CPA in place:  Yes  Recommendation Provided To: Provider: 1 via Called provider office    Intervention Accepted By: Provider: 0    Time Spent (min): 10

## 2024-01-25 NOTE — TELEPHONE ENCOUNTER
Specialty Medication Service    Date: 1/25/2024  Patient's Name: Carissa Merchant YOB: 1970            _____________________________________________________________________________________________    TB labs results received and added to media.    Jayla Brooks CPhT  Pharmacy   Specialty Medication Services   Phone: [phone] option 4    For Pharmacy Admin Tracking Only    Program: [facility]  CPA in place:  Yes  Recommendation Provided To: Provider: 1 via Called provider office  Intervention Detail:   Intervention Accepted By: Provider: 1  Gap Closed?:    Time Spent (min): 10

## 2024-03-08 ENCOUNTER — OFFICE VISIT (OUTPATIENT)
Age: 54
End: 2024-03-08

## 2024-03-08 VITALS
SYSTOLIC BLOOD PRESSURE: 137 MMHG | OXYGEN SATURATION: 99 % | TEMPERATURE: 97.6 F | DIASTOLIC BLOOD PRESSURE: 87 MMHG | HEIGHT: 66 IN | HEART RATE: 93 BPM | WEIGHT: 205.4 LBS | RESPIRATION RATE: 18 BRPM | BODY MASS INDEX: 33.01 KG/M2

## 2024-03-08 DIAGNOSIS — M25.561 RIGHT KNEE PAIN, UNSPECIFIED CHRONICITY: Primary | ICD-10-CM

## 2024-03-08 DIAGNOSIS — M17.0 BILATERAL PRIMARY OSTEOARTHRITIS OF KNEE: ICD-10-CM

## 2024-03-08 RX ORDER — BETAMETHASONE SODIUM PHOSPHATE AND BETAMETHASONE ACETATE 3; 3 MG/ML; MG/ML
6 INJECTION, SUSPENSION INTRA-ARTICULAR; INTRALESIONAL; INTRAMUSCULAR; SOFT TISSUE ONCE
Status: COMPLETED | OUTPATIENT
Start: 2024-03-08 | End: 2024-03-08

## 2024-03-08 RX ADMIN — BETAMETHASONE SODIUM PHOSPHATE AND BETAMETHASONE ACETATE 6 MG: 3; 3 INJECTION, SUSPENSION INTRA-ARTICULAR; INTRALESIONAL; INTRAMUSCULAR; SOFT TISSUE at 13:08

## 2024-03-08 RX ADMIN — BETAMETHASONE SODIUM PHOSPHATE AND BETAMETHASONE ACETATE 6 MG: 3; 3 INJECTION, SUSPENSION INTRA-ARTICULAR; INTRALESIONAL; INTRAMUSCULAR; SOFT TISSUE at 13:07

## 2024-03-08 ASSESSMENT — PATIENT HEALTH QUESTIONNAIRE - PHQ9
SUM OF ALL RESPONSES TO PHQ QUESTIONS 1-9: 0
2. FEELING DOWN, DEPRESSED OR HOPELESS: 0
1. LITTLE INTEREST OR PLEASURE IN DOING THINGS: 0
SUM OF ALL RESPONSES TO PHQ QUESTIONS 1-9: 0
SUM OF ALL RESPONSES TO PHQ QUESTIONS 1-9: 0
SUM OF ALL RESPONSES TO PHQ9 QUESTIONS 1 & 2: 0
SUM OF ALL RESPONSES TO PHQ QUESTIONS 1-9: 0

## 2024-03-08 NOTE — PROGRESS NOTES
Identified pt with two pt identifiers (name and ). Reviewed chart in preparation for visit and have obtained necessary documentation.    Carissa Merchant is a 53 y.o. female Knee Pain (B/l knee pain R>L )  .    Vitals:    24 1141   BP: 137/87   Site: Right Upper Arm   Position: Sitting   Cuff Size: Large Adult   Pulse: 93   Resp: 18   Temp: 97.6 °F (36.4 °C)   TempSrc: Oral   SpO2: 99%   Weight: 93.2 kg (205 lb 6.4 oz)   Height: 1.676 m (5' 6\")          1. Have you been to the ER, urgent care clinic since your last visit?  Hospitalized since your last visit?  no     2. Have you seen or consulted any other health care providers outside of the [facility] since your last visit?  Include any pap smears or colon screening.  yes - Dr. Seamus Frank[facility] 2 weeks ago.   
local tissues.  Following, an injection of a mixture of  6 mg Celestone and 1% lidocaine without epinephrine was administered to the left knee.  The needle was then withdrawn and the injection site dressed with a sterile bandage at the conclusion.  The procedure was well tolerated by the patient.  No immediate adverse reactions were noted.  Post injection instructions were given.      Diagnosis: Left Knee Osteoarthritis  Date of Procedure: 3/8/2024  PROCEDURE NOTE: Right knee injection of Celestone    Consent was obtained from the patient. The correct site was identified after confirmation with the patient.  Following identification and confirmation of the correct site with the patient, the superolateral right knee was prepped in the normal sterile fashion.  A local anesthetic of 1% lidocaine without epinephrine was then administered to the local tissues.  Following, an injection of a mixture of  6 mg Celestone and 1% lidocaine without epinephrine was administered to the right knee.  The needle was then withdrawn and the injection site dressed with a sterile bandage at the conclusion.  The procedure was well tolerated by the patient.  No immediate adverse reactions were noted.  Post injection instructions were given.      Diagnosis: Right Knee Osteoarthritis

## 2024-07-03 ENCOUNTER — TELEPHONE (OUTPATIENT)
Facility: HOSPITAL | Age: 54
End: 2024-07-03

## 2024-07-03 NOTE — TELEPHONE ENCOUNTER
Specialty Medication Service    Date: 7/3/2024  Patient's Name: Carissa Merchant YOB: 1970            _____________________________________________________________________________________________    Inbound fax received from external provider containing patient medical records requested by SMS. Patient identifiers confirmed on each page to ensure accuracy and protection of privacy. Imported into the chart media, PharmD aware notes are available for viewing.    Jayla Brooks CPhT  Pharmacy   Specialty Medication Services   Phone: [phone] option 4    For Pharmacy Admin Tracking Only    Program: [facility]  CPA in place:  Yes  Recommendation Provided To: Provider: 1 via Called provider office  Intervention Detail:   Intervention Accepted By: Provider: 1  Gap Closed?:    Time Spent (min): 15

## 2024-07-03 NOTE — TELEPHONE ENCOUNTER
Specialty Medication Service    Date: 7/3/2024  Patient's Name: Carissa Merchant YOB: 1970            _____________________________________________________________________________________________    Spoke with patient to schedule PharmD follow up appointment for Specialty Medication Services. Patient scheduled 07/15/24 @[facility]      Grace Recinos CPhT  Clinical   Specialty Medication Services  Phone: [phone] option #4  Fax: [phone]    For Pharmacy Admin Tracking Only    Program: [facility]  CPA in place:  Yes  Recommendation Provided To: Patient/Caregiver: 1 via Telephone  Intervention Detail: Scheduled Appointment  Intervention Accepted By: Patient/Caregiver: 1   Time Spent (min): 15

## 2024-07-03 NOTE — TELEPHONE ENCOUNTER
Specialty Medication Service    Date: 7/3/2024  Patient's Name: Carissa Merchant YOB: 1970            _____________________________________________________________________________________________    Called patient's specialist office to request most recent office visit summary and relevant labs for Specialty Medication Service formulary medication. Left message to have faxed to [phone]. Also sent a fax    Grace Recinos CPhT  Clinical   Specialty Medication Services  Phone: [phone] option #4  Fax: [phone]    For Pharmacy Admin Tracking Only    Program: [facility]  CPA in place:  Yes  Recommendation Provided To: Provider: 1 via Called provider office and Fax sent to office  Intervention Accepted By: Provider: 0  Time Spent (min): 15

## 2024-07-15 ENCOUNTER — PHARMACY VISIT (OUTPATIENT)
Facility: HOSPITAL | Age: 54
End: 2024-07-15

## 2024-07-15 DIAGNOSIS — M06.09 RHEUMATOID ARTHRITIS WITHOUT RHEUMATOID FACTOR, MULTIPLE SITES (HCC): Primary | ICD-10-CM

## 2024-07-15 RX ORDER — ARNICA FLOWER EXTRACT
LIQUID (GRAM) MISCELLANEOUS
COMMUNITY

## 2024-07-15 RX ORDER — TRIAMCINOLONE ACETONIDE 1 MG/G
OINTMENT TOPICAL
COMMUNITY
Start: 2024-05-28

## 2024-07-15 RX ORDER — INFLIXIMAB 100 MG/10ML
INJECTION, POWDER, LYOPHILIZED, FOR SOLUTION INTRAVENOUS
Qty: 5 EACH | Refills: 7 | Status: SHIPPED | OUTPATIENT
Start: 2024-07-15

## 2024-07-15 ASSESSMENT — PATIENT HEALTH QUESTIONNAIRE - PHQ9
SUM OF ALL RESPONSES TO PHQ9 QUESTIONS 1 & 2: 0
SUM OF ALL RESPONSES TO PHQ QUESTIONS 1-9: 0
1. LITTLE INTEREST OR PLEASURE IN DOING THINGS: NOT AT ALL
2. FEELING DOWN, DEPRESSED OR HOPELESS: NOT AT ALL
SUM OF ALL RESPONSES TO PHQ QUESTIONS 1-9: 0

## 2024-07-15 NOTE — PROGRESS NOTES
[facility]  Ambulatory Clinical Pharmacist  Specialty Medication Services  Phone: [phone] option #4  Fax: [phone]    For Pharmacy Admin Tracking Only    Program: SMS  CPA in place:  Yes  Recommendation Provided To: Patient/Caregiver: 3 via Virtual Visit  Intervention Detail: Referral to Other Provider, Refill(s) Provided, and Vaccine Recommended/Administered  Intervention Accepted By: Patient/Caregiver: 3    Time Spent (min): 60

## 2024-07-23 ENCOUNTER — PHARMACY VISIT (OUTPATIENT)
Facility: HOSPITAL | Age: 54
End: 2024-07-23

## 2024-07-23 DIAGNOSIS — M06.09 RHEUMATOID ARTHRITIS WITHOUT RHEUMATOID FACTOR, MULTIPLE SITES (HCC): Primary | ICD-10-CM

## 2024-07-23 NOTE — PROGRESS NOTES
Specialty Medication Follow up Virtual Visit  [facility]  [address] [place] [address]  Dept: [phone]  Dept Fax: [phone]  Loc: [phone]  Date of patient's visit: 7/23/2024  Patient's Name:  Carissa Merchant YOB: 1970            Patient Care Team:  Mai Javed MD as PCP - General  Mai Javed MD as PCP - Empaneled Provider  ================================================================    REASON FOR VISIT/CHIEF COMPLAINT:  No chief complaint on file.    HISTORY OF PRESENTING ILLNESS:  Carissa Merchant is 53 y.o. is here for a follow up virtual visit for specialty medication.    Specialty Medication: Infliximab   Frequency: every 7 weeks  Indication: RA  Initially Diagnosed:   Specialist: Jostin  Last Specialist Visit: 6/2024  Side effects includes: none   Last visit with me was: NA   Carissa Merchant has no new complain today.     DIAGNOSTIC FINDINGS:  CBC:  Lab Results   Component Value Date/Time    WBC 13.5 07/14/2023 04:31 AM    HGB 11.1 07/14/2023 04:31 AM     07/14/2023 04:31 AM       BMP:    Lab Results   Component Value Date/Time     08/02/2021 05:33 AM    K 3.3 08/02/2021 05:33 AM     08/02/2021 05:33 AM    CO2 23 08/02/2021 05:33 AM    BUN 11 08/02/2021 05:33 AM    CREATININE 0.83 08/02/2021 05:33 AM    GLUCOSE 102 08/02/2021 05:33 AM       HEMOGLOBIN A1C: No results found for: \"LABA1C\"    FASTING LIPID PANEL:  Lab Results   Component Value Date    CHOL 189 09/01/2020    HDL 73 09/01/2020    TRIG 79 09/01/2020       PHYSICAL EXAM:  There were no vitals filed for this visit.  BP Readings from Last 3 Encounters:   03/08/24 137/87   07/14/23 103/71   07/06/23 135/85          ASSESSMENT AND PLAN:  Diagnoses and all orders for this visit:    Rheumatoid arthritis without rheumatoid factor, multiple sites ([facility])  -     [facility] -  Referral to Specialty Medication Service      FOLLOW UP AND

## 2024-08-20 ENCOUNTER — TELEPHONE (OUTPATIENT)
Facility: HOSPITAL | Age: 54
End: 2024-08-20

## 2024-08-20 NOTE — TELEPHONE ENCOUNTER
Specialty Medication Service    Date: 8/20/2024  Patient's Name: Carissa Merchant YOB: 1970            _____________________________________________________________________________________________    PA for patient's infliximab submitted to payer for approval. [facility] key: [id number]. Will continue to monitor for PA determination.     Arnoldo Wan, PharmD [facility]  Ambulatory Clinical Pharmacist  Specialty Medication Services  Phone: [phone] option #4  Fax: [phone]    For Pharmacy Admin Tracking Only    Program: SMS  CPA in place:  Yes  Recommendation Provided To: Other: 1  Intervention Detail: Benefit Assistance  Intervention Accepted By: Other: 0    Time Spent (min): 30

## 2024-08-23 NOTE — TELEPHONE ENCOUNTER
Specialty Medication Service    Date: 8/23/2024  Patient's Name: Carissa Merchant YOB: 1970            _____________________________________________________________________________________________    Patient's PA for infliximab has been approved through 08/23/24 - 08/22/25. Approval letter uploaded in Media tab    Grace Recinos CPhT  Clinical   Specialty Medication Services  Phone: [phone] option #4  Fax: [phone]    For Pharmacy Admin Tracking Only    Program: [employer]  CPA in place:  Yes  Recommendation Provided To: Other: 1  Intervention Detail: Benefit Assistance  Intervention Accepted By: Other: 1  Gap Closed?:    Time Spent (min): 15

## 2024-10-15 ENCOUNTER — OFFICE VISIT (OUTPATIENT)
Age: 54
End: 2024-10-15

## 2024-10-15 VITALS
DIASTOLIC BLOOD PRESSURE: 91 MMHG | BODY MASS INDEX: 33.41 KG/M2 | HEART RATE: 86 BPM | RESPIRATION RATE: 22 BRPM | WEIGHT: 207 LBS | SYSTOLIC BLOOD PRESSURE: 131 MMHG | OXYGEN SATURATION: 97 % | TEMPERATURE: 98.2 F

## 2024-10-15 DIAGNOSIS — J40 BRONCHITIS: Primary | ICD-10-CM

## 2024-10-15 DIAGNOSIS — J01.00 ACUTE MAXILLARY SINUSITIS, RECURRENCE NOT SPECIFIED: ICD-10-CM

## 2024-10-15 RX ORDER — DEXTROMETHORPHAN HYDROBROMIDE AND PROMETHAZINE HYDROCHLORIDE 15; 6.25 MG/5ML; MG/5ML
5 SYRUP ORAL 4 TIMES DAILY PRN
Qty: 120 ML | Refills: 0 | Status: SHIPPED | OUTPATIENT
Start: 2024-10-15 | End: 2024-10-22

## 2024-10-15 RX ORDER — PREDNISONE 50 MG/1
50 TABLET ORAL DAILY
Qty: 3 TABLET | Refills: 0 | Status: SHIPPED | OUTPATIENT
Start: 2024-10-15 | End: 2024-10-18

## 2024-10-15 RX ORDER — AMOXICILLIN 500 MG/1
500 CAPSULE ORAL 3 TIMES DAILY
Qty: 21 CAPSULE | Refills: 0 | Status: SHIPPED | OUTPATIENT
Start: 2024-10-15 | End: 2024-10-22

## 2024-10-15 NOTE — PROGRESS NOTES
Patient Name: Carissa Merchant   YOB: 1970   Patient Status: New patient,   Chief Complaint: Cough (Day 12 of a cough and sinus pressure pt tested covid negative 3x. )      ____________________________________________________________________________________________    External Records Reviewed: none    Limitation to History: none    Outside Historian: none    SUBJECTIVE/OBJECTIVE:  Carissa Merchant is a 53 y.o. female presents with complaint of dry cough, sinus congestion / pain / pressure.  Symptoms began 12 days ago and are worsening since onset.  Patient describes sinus pain as throbbing to bilateral cheeks and upper teeth, 5/10 in severity, constant and with no radiation.  Symptoms are aggravated by bending over makes face pain worse and alleviated by nothing. The patient denies fever, shortness of breath or chest pain. She reports that she has had 3 negative Covid tests since onset of symptoms and she called her PCP and was called in an inhaler for the cough but was told she likely needed to be seen for sinus and they did not have appointments for several days.  No other acute symptoms reported at this time.         PAST MEDICAL HISTORY:   Medical: Pt  has a past medical history of Asthma, Autoimmune disorder (HCC), GERD (gastroesophageal reflux disease), Lyme disease (1993), and Ulcerative colitis (HCC).  Surgical: Pt  has a past surgical history that includes Liposuction; vascular surgery; Portacath placement (2008); Debridement tennis elbow (2017); and Hysterectomy (N/A, 7/13/2023).  Family: Pt family history includes Alcohol Abuse in her mother; No Known Problems in her brother.  Social: Pt   Social History     Socioeconomic History    Marital status: Unknown     Spouse name: Not on file    Number of children: Not on file    Years of education: Not on file    Highest education level: Not on file   Occupational History    Not on file   Tobacco Use    Smoking status: Never    Smokeless

## 2024-10-15 NOTE — PATIENT INSTRUCTIONS
If the doctor prescribed antibiotics, take them as directed. Do not stop taking them just because you feel better. You need to take the full course of antibiotics.  Sinus infections are usually self-limiting and do not require antibiotic therapy.   Use saline (saltwater) nasal washes. This can help keep your nasal passages open and wash out mucus and allergens.  You can buy saline nose washes at a grocery store or drugstore. Follow the instructions on the package.  Try a decongestant nasal spray like oxymetazoline (Afrin) Do not use it for more than 3 days in a row. Using it for more than 3 days can make your congestion worse.  If needed, take an over-the-counter pain medicine, such as acetaminophen (Tylenol), ibuprofen (Advil, Motrin), or naproxen (Aleve). Read and follow all instructions on the label.  Be careful when taking over-the-counter cold or influenza (flu) medicines and Tylenol at the same time. Many of these medicines have acetaminophen, which is Tylenol. Read the labels to make sure that you are not taking more than the recommended dose. Too much acetaminophen (Tylenol) can be harmful.  Try a steroid nasal spray daily such as Flonase as well as antihistamine such as Claritin or Zyrtec.   Breathe warm, moist air. You can use a steamy shower, a hot bath, or a sink filled with hot water. Avoid cold, dry air. Using a humidifier in your home may help. Follow the directions for cleaning the machine.  Do not smoke or allow others to smoke around you. If you need help quitting, talk to your doctor about stop-smoking programs and medicines. These can increase your chances of quitting for good.  Return to Clinic if mild worsening or changes in symptoms.  Report to ER if high or persistent fever, dehydration, new or severe worsening of symptoms.  Please follow up with PCP for persistent or recurrent symptoms.

## 2024-10-22 ENCOUNTER — HOSPITAL ENCOUNTER (OUTPATIENT)
Facility: HOSPITAL | Age: 54
Discharge: HOME | End: 2024-10-25
Payer: COMMERCIAL

## 2024-10-22 ENCOUNTER — TRANSCRIBE ORDERS (OUTPATIENT)
Facility: HOSPITAL | Age: 54
End: 2024-10-22

## 2024-10-22 DIAGNOSIS — R06.2 WHEEZE: ICD-10-CM

## 2024-10-22 DIAGNOSIS — R06.2 WHEEZE: Primary | ICD-10-CM

## 2024-10-22 PROCEDURE — 71046 X-RAY EXAM CHEST 2 VIEWS: CPT

## 2024-12-17 ENCOUNTER — TELEPHONE (OUTPATIENT)
Facility: HOSPITAL | Age: 54
End: 2024-12-17

## 2024-12-17 NOTE — TELEPHONE ENCOUNTER
Specialty Medication Service    Date: 12/17/2024  Patient's Name: Carissa Merchant YOB: 1970            _____________________________________________________________________________________________    Spoke with patient to schedule PharmD follow up appointment for Specialty Medication Services. Patient scheduled 12/27 @130p  Called specialist office (Seamus Bruno MD ) to request office notes.      [place] [address]   Phone: [phone]    Chart notes added to      Grace Recinos CPhT  Clinical   Specialty Medication Services  Phone: [phone] option #4  Fax: [phone]    For Pharmacy Admin Tracking Only    Program: [facility]  CPA in place:  Yes  Recommendation Provided To: Provider: 1 via Fax sent to office and Patient/Caregiver: 1 via Telephone  Intervention Detail: Scheduled Appointment  Intervention Accepted By: Provider: 1 and Patient/Caregiver: 1  Gap Closed?:    Time Spent (min): 10

## 2024-12-23 NOTE — PROGRESS NOTES
includes: Remicade 5mg/kg every 7 weeks (plan to move to every 8 weeks in future)     Medication Effectiveness: Patient disease is well controlled on current therapy, per symptom assessment. Patient denies flares and reports being stable for 20 years. Colonoscopy, March 2023 was mostly normal. Some minor inflammation that resolved. Overdue to see GI. Discussed calling to make an appointment. Patient is agreeable. States she is due for colonoscopy.     Patient had no questions regarding the medication's warnings, precautions, and contraindications. Confirmed appropriate storage and disposal.    Patient had no concerns with the administration process.    Current disease state symptoms include: None; continues to have 1 bowel movement per day that is formed, denies any other symptoms.     No side effects/adverse events reported, and no adherence issues identified.    Functional and cognitive limitations include: none    Patient is not considered high risk. Based on patient feedback/results of the assessment, the therapy is still appropriate.    No medication-related problem(s) or patient need(s) identified that would require a care plan. Follow up in 180 days   3. Immunizations  Immunization status: up to date and documented, missing doses of Shingrix, [id number].   Shingrix: denies - states never had chicken pox and has been vaccinated with varicella vaccine. Made her feel terrible. Will not pursue Shingles vaccine. No further action needed.   [id number]: Has not received yet, but patient states still plans to obtain Follow-up next SMS.     4. Drug Interactions  No new drug interactions identified category D or higher today.     5. Other Identified Potential Issues  Patient has completed SMS consent form. No questions in regard to consent form. Read consent form to patient and obtained a verbal agreement.   Transfer for refills: transferred patient to [facility] to setup next shipment that is needed by 1/3/25.   TB risk

## 2024-12-27 ENCOUNTER — PHARMACY VISIT (OUTPATIENT)
Facility: HOSPITAL | Age: 54
End: 2024-12-27

## 2024-12-27 DIAGNOSIS — M06.09 RHEUMATOID ARTHRITIS WITHOUT RHEUMATOID FACTOR, MULTIPLE SITES (HCC): Primary | ICD-10-CM

## 2024-12-27 ASSESSMENT — PATIENT HEALTH QUESTIONNAIRE - PHQ9
SUM OF ALL RESPONSES TO PHQ9 QUESTIONS 1 & 2: 0
SUM OF ALL RESPONSES TO PHQ QUESTIONS 1-9: 0
2. FEELING DOWN, DEPRESSED OR HOPELESS: NOT AT ALL
SUM OF ALL RESPONSES TO PHQ QUESTIONS 1-9: 0
1. LITTLE INTEREST OR PLEASURE IN DOING THINGS: NOT AT ALL

## 2024-12-27 ASSESSMENT — PROMIS GLOBAL HEALTH SCALE
IN GENERAL, WOULD YOU SAY YOUR HEALTH IS...[ON A SCALE OF 1 (POOR) TO 5 (EXCELLENT)]: EXCELLENT
IN GENERAL, HOW WOULD YOU RATE YOUR SATISFACTION WITH YOUR SOCIAL ACTIVITIES AND RELATIONSHIPS [ON A SCALE OF 1 (POOR) TO 5 (EXCELLENT)]?: EXCELLENT
IN GENERAL, HOW WOULD YOU RATE YOUR MENTAL HEALTH, INCLUDING YOUR MOOD AND YOUR ABILITY TO THINK [ON A SCALE OF 1 (POOR) TO 5 (EXCELLENT)]?: EXCELLENT
TO WHAT EXTENT ARE YOU ABLE TO CARRY OUT YOUR EVERYDAY PHYSICAL ACTIVITIES SUCH AS WALKING, CLIMBING STAIRS, CARRYING GROCERIES, OR MOVING A CHAIR [ON A SCALE OF 1 (NOT AT ALL) TO 5 (COMPLETELY)]?: COMPLETELY
IN GENERAL, WOULD YOU SAY YOUR QUALITY OF LIFE IS...[ON A SCALE OF 1 (POOR) TO 5 (EXCELLENT)]: EXCELLENT
IN THE PAST 7 DAYS, HOW OFTEN HAVE YOU BEEN BOTHERED BY EMOTIONAL PROBLEMS, SUCH AS FEELING ANXIOUS, DEPRESSED, OR IRRITABLE [ON A SCALE FROM 1 (NEVER) TO 5 (ALWAYS)]?: NEVER
IN GENERAL, PLEASE RATE HOW WELL YOU CARRY OUT YOUR USUAL SOCIAL ACTIVITIES (INCLUDES ACTIVITIES AT HOME, AT WORK, AND IN YOUR COMMUNITY, AND RESPONSIBILITIES AS A PARENT, CHILD, SPOUSE, EMPLOYEE, FRIEND, ETC) [ON A SCALE OF 1 (POOR) TO 5 (EXCELLENT)]?: EXCELLENT
IN THE PAST 7 DAYS, HOW WOULD YOU RATE YOUR PAIN ON AVERAGE [ON A SCALE FROM 0 (NO PAIN) TO 10 (WORST IMAGINABLE PAIN)]?: 0 NO PAIN
SUM OF RESPONSES TO QUESTIONS 3, 6, 7, & 8: 15
SUM OF RESPONSES TO QUESTIONS 2, 4, 5, & 10: 20
IN GENERAL, HOW WOULD YOU RATE YOUR PHYSICAL HEALTH [ON A SCALE OF 1 (POOR) TO 5 (EXCELLENT)]?: EXCELLENT

## 2025-05-12 ENCOUNTER — OFFICE VISIT (OUTPATIENT)
Age: 55
End: 2025-05-12

## 2025-05-12 VITALS
DIASTOLIC BLOOD PRESSURE: 81 MMHG | SYSTOLIC BLOOD PRESSURE: 114 MMHG | HEART RATE: 98 BPM | WEIGHT: 203 LBS | OXYGEN SATURATION: 98 % | TEMPERATURE: 98 F | RESPIRATION RATE: 16 BRPM | BODY MASS INDEX: 32.77 KG/M2

## 2025-05-12 DIAGNOSIS — H10.9 BACTERIAL CONJUNCTIVITIS OF LEFT EYE: Primary | ICD-10-CM

## 2025-05-12 DIAGNOSIS — R35.0 URINARY FREQUENCY: ICD-10-CM

## 2025-05-12 LAB
BILIRUBIN, URINE, POC: NEGATIVE
BLOOD URINE, POC: ABNORMAL
GLUCOSE URINE, POC: NEGATIVE
KETONES, URINE, POC: NEGATIVE
LEUKOCYTE ESTERASE, URINE, POC: NEGATIVE
NITRITE, URINE, POC: NEGATIVE
PH, URINE, POC: 7 (ref 4.6–8)
PROTEIN,URINE, POC: NEGATIVE
SPECIFIC GRAVITY, URINE, POC: 1015 (ref 1–1.03)
URINALYSIS CLARITY, POC: CLEAR
URINALYSIS COLOR, POC: YELLOW
UROBILINOGEN, POC: ABNORMAL

## 2025-05-12 RX ORDER — TURMERIC ROOT EXTRACT 500 MG
TABLET ORAL
COMMUNITY

## 2025-05-12 RX ORDER — NITROFURANTOIN 25; 75 MG/1; MG/1
100 CAPSULE ORAL 2 TIMES DAILY
Qty: 14 CAPSULE | Refills: 0 | Status: SHIPPED | OUTPATIENT
Start: 2025-05-12 | End: 2025-05-19

## 2025-05-12 RX ORDER — TRETINOIN 0.25 MG/G
CREAM TOPICAL
COMMUNITY
Start: 2024-05-28

## 2025-05-12 RX ORDER — CETIRIZINE HYDROCHLORIDE 10 MG/1
TABLET ORAL
COMMUNITY

## 2025-05-12 RX ORDER — POLYMYXIN B SULFATE AND TRIMETHOPRIM 1; 10000 MG/ML; [USP'U]/ML
1 SOLUTION OPHTHALMIC 4 TIMES DAILY
Qty: 10 ML | Refills: 0 | Status: SHIPPED | OUTPATIENT
Start: 2025-05-12 | End: 2025-05-17

## 2025-05-12 NOTE — PROGRESS NOTES
Carissa Merchant (:  1970) is a 54 y.o. female,Established patient, here for evaluation of the following chief complaint(s):  Urinary Frequency (Urinary frequency since this morning ) and Eye Drainage (Left eye drainage x3 days)      Assessment & Plan :  Visit Diagnoses and Associated Orders         Bacterial conjunctivitis of left eye    -  Primary    trimethoprim-polymyxin b (POLYTRIM) 92313-8.1 UNIT/ML-% ophthalmic solution [14703]             Urinary frequency        AMB POC URINALYSIS DIP STICK AUTO W/O MICRO [29591 CPT(R)]      Culture, Urine [09647 Custom]   - Future Order    nitrofurantoin, macrocrystal-monohydrate, (MACROBID) 100 MG capsule [91164]      Culture, Urine [90593 Custom]           ORDERS WITHOUT AN ASSOCIATED DIAGNOSIS    Turmeric 500 MG TABS [871267]      tretinoin (RETIN-A) 0.025 % cream [13707]      Multiple Vitamin (MULTIVITAMIN ADULT PO) [067916]      cetirizine (ZYRTEC ALLERGY) 10 MG tablet [9506]            I am sending a urine culture off to the lab and we will let you know if it grows anything in about 3 days    I sent a prescription for nitrofurantoin to fill and take with you so that you have it on your trip abroad if needed for UTI    For the bacterial conjunctivitis: Trimethoprim-polymyxin B eyedrops: 1 drop 4 times a day to the left eye    Subjective :    Urinary Frequency   Associated symptoms include frequency.        54 y.o. female presents with concerns for urinary tract infection.  She woke up this morning and had significant urinary frequency.  She has had this in the past and UAs will look normal but then the culture will grow out bacteria.  She is supposed to be leaving for [place] to visit her daughter in [place] and [place] in [place] next week and is concerned that she will get sick while she is over they are right before.  Has had mild dysuria today but went on a 20 mile bike ride yesterday and wonder if that is the cause.  No fever or chills, no back pain or

## 2025-05-12 NOTE — PATIENT INSTRUCTIONS
I am sending a urine culture off to the lab and we will let you know if it grows anything in about 3 days    I sent a prescription for nitrofurantoin to fill and take with you so that you have it on your trip abroad if needed for UTI    For the bacterial conjunctivitis: Trimethoprim-polymyxin B eyedrops: 1 drop 4 times a day to the left eye

## 2025-05-13 LAB
BACTERIA SPEC CULT: NORMAL
CC UR VC: NORMAL
SERVICE CMNT-IMP: NORMAL

## 2025-06-02 ENCOUNTER — TELEPHONE (OUTPATIENT)
Facility: HOSPITAL | Age: 55
End: 2025-06-02

## 2025-06-02 NOTE — TELEPHONE ENCOUNTER
Specialty Medication Service    Date: 6/2/2025  Patient's Name: Carissa Merchant YOB: 1970            _____________________________________________________________________________________________    Sent Mychart message to schedule PharmD follow up appointment for Specialty Medication Services.      Grace Recinos CPhT  Clinical   Specialty Medication Services  Phone: [phone] option #4  Fax: [phone]

## 2025-06-06 NOTE — TELEPHONE ENCOUNTER
Specialty Medication Service    Date: 6/6/2025  Patient's Name: Carissa Merchant YOB: 1970            _____________________________________________________________________________________________    Left 3 messages and Sent text message to schedule PharmD follow up appointment for Specialty Medication Services. Please call: [phone] option 4. Will continue to outreach as appropriate.     Grace Recinos CPhT  Clinical   Specialty Medication Services  Phone: [phone] option #4  Fax: [phone]    For Pharmacy Admin Tracking Only    Program: SMS  CPA in place:  Yes  Recommendation Provided To: Patient/Caregiver: 1 via Telephone and Smart[employer] Message  Intervention Detail: Scheduled Appointment  Intervention Accepted By: Patient/Caregiver: 0  Gap Closed?:    Time Spent (min): 10

## 2025-07-09 ENCOUNTER — TELEPHONE (OUTPATIENT)
Facility: HOSPITAL | Age: 55
End: 2025-07-09

## 2025-07-09 NOTE — TELEPHONE ENCOUNTER
Specialty Medication Service    Date: 7/9/2025  Patient's Name: Carissa Merchant YOB: 1970            _____________________________________________________________________________________________    Sent Mychart message to schedule PharmD follow up appointment for Specialty Medication Services. Please call: [phone] option 4. Will continue to outreach as appropriate.     Grace Recinos CPhT  Clinical   Specialty Medication Services  Phone: [phone] option #4  Fax: [phone]

## 2025-08-04 ENCOUNTER — TELEPHONE (OUTPATIENT)
Facility: HOSPITAL | Age: 55
End: 2025-08-04

## 2025-08-13 ENCOUNTER — TELEPHONE (OUTPATIENT)
Facility: HOSPITAL | Age: 55
End: 2025-08-13

## 2025-08-18 ENCOUNTER — PHARMACY VISIT (OUTPATIENT)
Facility: HOSPITAL | Age: 55
End: 2025-08-18

## 2025-08-18 DIAGNOSIS — M06.09 RHEUMATOID ARTHRITIS WITHOUT RHEUMATOID FACTOR, MULTIPLE SITES (HCC): Primary | ICD-10-CM

## 2025-08-18 RX ORDER — MESALAMINE 1000 MG/1
SUPPOSITORY RECTAL
COMMUNITY
Start: 2025-05-14

## 2025-08-18 RX ORDER — POLYMYXIN B SULFATE AND TRIMETHOPRIM 1; 10000 MG/ML; [USP'U]/ML
SOLUTION OPHTHALMIC
COMMUNITY
Start: 2025-06-26

## 2025-08-18 ASSESSMENT — PROMIS GLOBAL HEALTH SCALE
IN THE PAST 7 DAYS, HOW OFTEN HAVE YOU BEEN BOTHERED BY EMOTIONAL PROBLEMS, SUCH AS FEELING ANXIOUS, DEPRESSED, OR IRRITABLE [ON A SCALE FROM 1 (NEVER) TO 5 (ALWAYS)]?: NEVER
IN GENERAL, WOULD YOU SAY YOUR QUALITY OF LIFE IS...[ON A SCALE OF 1 (POOR) TO 5 (EXCELLENT)]: EXCELLENT
IN GENERAL, HOW WOULD YOU RATE YOUR PHYSICAL HEALTH [ON A SCALE OF 1 (POOR) TO 5 (EXCELLENT)]?: EXCELLENT
IN THE PAST 7 DAYS, HOW WOULD YOU RATE YOUR PAIN ON AVERAGE [ON A SCALE FROM 0 (NO PAIN) TO 10 (WORST IMAGINABLE PAIN)]?: 0 NO PAIN
SUM OF RESPONSES TO QUESTIONS 3, 6, 7, & 8: 15
TO WHAT EXTENT ARE YOU ABLE TO CARRY OUT YOUR EVERYDAY PHYSICAL ACTIVITIES SUCH AS WALKING, CLIMBING STAIRS, CARRYING GROCERIES, OR MOVING A CHAIR [ON A SCALE OF 1 (NOT AT ALL) TO 5 (COMPLETELY)]?: COMPLETELY
IN GENERAL, HOW WOULD YOU RATE YOUR SATISFACTION WITH YOUR SOCIAL ACTIVITIES AND RELATIONSHIPS [ON A SCALE OF 1 (POOR) TO 5 (EXCELLENT)]?: EXCELLENT
IN GENERAL, HOW WOULD YOU RATE YOUR MENTAL HEALTH, INCLUDING YOUR MOOD AND YOUR ABILITY TO THINK [ON A SCALE OF 1 (POOR) TO 5 (EXCELLENT)]?: EXCELLENT
SUM OF RESPONSES TO QUESTIONS 2, 4, 5, & 10: 20
IN GENERAL, WOULD YOU SAY YOUR HEALTH IS...[ON A SCALE OF 1 (POOR) TO 5 (EXCELLENT)]: EXCELLENT
IN GENERAL, PLEASE RATE HOW WELL YOU CARRY OUT YOUR USUAL SOCIAL ACTIVITIES (INCLUDES ACTIVITIES AT HOME, AT WORK, AND IN YOUR COMMUNITY, AND RESPONSIBILITIES AS A PARENT, CHILD, SPOUSE, EMPLOYEE, FRIEND, ETC) [ON A SCALE OF 1 (POOR) TO 5 (EXCELLENT)]?: EXCELLENT

## 2025-08-18 ASSESSMENT — PATIENT HEALTH QUESTIONNAIRE - PHQ9
SUM OF ALL RESPONSES TO PHQ QUESTIONS 1-9: 0
2. FEELING DOWN, DEPRESSED OR HOPELESS: NOT AT ALL
1. LITTLE INTEREST OR PLEASURE IN DOING THINGS: NOT AT ALL
SUM OF ALL RESPONSES TO PHQ QUESTIONS 1-9: 0

## 2025-08-19 ENCOUNTER — TELEPHONE (OUTPATIENT)
Facility: HOSPITAL | Age: 55
End: 2025-08-19

## 2025-08-25 ENCOUNTER — PHARMACY VISIT (OUTPATIENT)
Facility: HOSPITAL | Age: 55
End: 2025-08-25

## 2025-08-25 DIAGNOSIS — M06.09 RHEUMATOID ARTHRITIS WITHOUT RHEUMATOID FACTOR, MULTIPLE SITES (HCC): Primary | ICD-10-CM

## 2025-08-29 ENCOUNTER — TELEPHONE (OUTPATIENT)
Facility: HOSPITAL | Age: 55
End: 2025-08-29

## (undated) DEVICE — TRI-LUMEN FILTERED TUBE SET WITH ACTIVATED CHARCOAL FILTER: Brand: AIRSEAL

## (undated) DEVICE — TIP COVER ACCESSORY

## (undated) DEVICE — MINOR BASIN -SMH: Brand: MEDLINE INDUSTRIES, INC.

## (undated) DEVICE — SYRINGE MED 10ML LUERLOCK TIP W/O SFTY DISP

## (undated) DEVICE — CANNULA SEAL

## (undated) DEVICE — STRIP SKIN CLSR W0.25XL4IN WHT SPUNBOUND FBR NYL HI ADH

## (undated) DEVICE — SUTURE MCRYL SZ 4-0 L27IN ABSRB UD L19MM PS-2 1/2 CIR PRIM Y426H

## (undated) DEVICE — INSUFFLATION NEEDLE TO ESTABLISH PNEUMOPERITONEUM.: Brand: INSUFFLATION NEEDLE

## (undated) DEVICE — NEEDLE SPNL 22GA L3.5IN BLK HUB S STL REG WALL FIT STYL W/

## (undated) DEVICE — ELECTRO LUBE IS A SINGLE PATIENT USE DEVICE THAT IS INTENDED TO BE USED ON ELECTROSURGICAL ELECTRODES TO REDUCE STICKING.: Brand: KEY SURGICAL ELECTRO LUBE

## (undated) DEVICE — ARM DRAPE

## (undated) DEVICE — HYPODERMIC SAFETY NEEDLE: Brand: MONOJECT

## (undated) DEVICE — SOLUTION IRRIGATION H2O 0797305] ICU MEDICAL INC]

## (undated) DEVICE — SYRINGE IRRIG 60ML SFT PLIABLE BLB EZ TO GRP 1 HND USE W/

## (undated) DEVICE — OBTURATOR ROBOTIC DIA8MM BLDELSS ENDOSCP DISP DA VINCI SI

## (undated) DEVICE — PACK,BASIC,SIRUS,V: Brand: MEDLINE

## (undated) DEVICE — INTENT OT USE PROVIDES A STERILE INTERFACE BETWEEN THE OPERATING ROOM SURGICAL LAMPS (NON-STERILE) AND THE SURGEON OR STAFF WORKING IN THE STERILE FIELD.: Brand: ASPEN® ALC PLUS LIGHT HANDLE COVER

## (undated) DEVICE — SYSTEM EVAC SMOKE LAPARSCOPIC

## (undated) DEVICE — SUTURE SZ 0 27IN 5/8 CIR UR-6  TAPER PT VIOLET ABSRB VICRYL J603H

## (undated) DEVICE — PAD PT POS 36 IN SURGYPAD DISP

## (undated) DEVICE — GLOVE SURG SZ 65 L12IN FNGR THK94MIL STD WHT LTX FREE

## (undated) DEVICE — SUTURE EASE CROSSBOW CLSR SYS

## (undated) DEVICE — SUTURE VLOC 90 2/0 VL 6 GS-22 VLOCM2105

## (undated) DEVICE — RING RETRCT W14.1XL14.1CM PLAS SELF RET ADJ LTWT DISP LONE 3307G] COOPER SURGICAL]

## (undated) DEVICE — VCARE SMALL, UTERINE MANIPULATOR, VAGINAL-CERVICAL-AHLUWALIA'S-RETRACTOR-ELEVATOR: Brand: VCARE

## (undated) DEVICE — CYSTO/BLADDER IRRIGATION SET, REGULATING CLAMP

## (undated) DEVICE — SUTURE VCRL SZ 2-0 L27IN ABSRB UD L26MM SH 1/2 CIR J417H

## (undated) DEVICE — GYN LAPAROSCOPY - SMH: Brand: MEDLINE INDUSTRIES, INC.

## (undated) DEVICE — SUTURE CV-2 36IN TH-26 DA 1DZ 2N02A

## (undated) DEVICE — SUTURE VCRL SZ 2-0 L27IN ABSRB VLT L26MM UR-6 5/8 CIR J602H

## (undated) DEVICE — SOLUTION IV 1000ML 0.9% SOD CHL PH 5 INJ USP VIAFLX PLAS

## (undated) DEVICE — SUTURE DEV SZ 2-0 WND CLSR ABSRB GS-22 VLOC COVIDIEN VLOCM2145

## (undated) DEVICE — AIRSEAL 8 MM ACCESS PORT AND LOW PROFILE OBTURATOR WITH BLADELESS OPTICAL TIP, 120 MM LENGTH: Brand: AIRSEAL

## (undated) DEVICE — SUTURE VCRL SZ 0 L27IN ABSRB UD L26MM CT-2 1/2 CIR J270H

## (undated) DEVICE — ELASTIC STAY 5MM SHRP HK